# Patient Record
Sex: FEMALE | Race: OTHER
[De-identification: names, ages, dates, MRNs, and addresses within clinical notes are randomized per-mention and may not be internally consistent; named-entity substitution may affect disease eponyms.]

---

## 2017-01-06 ENCOUNTER — APPOINTMENT (OUTPATIENT)
Dept: ANTEPARTUM | Facility: CLINIC | Age: 32
End: 2017-01-06

## 2017-01-06 ENCOUNTER — ASOB RESULT (OUTPATIENT)
Age: 32
End: 2017-01-06

## 2017-01-09 ENCOUNTER — OUTPATIENT (OUTPATIENT)
Dept: INPATIENT UNIT | Facility: HOSPITAL | Age: 32
LOS: 1 days | Discharge: HOME | End: 2017-01-09

## 2017-01-13 ENCOUNTER — APPOINTMENT (OUTPATIENT)
Dept: ANTEPARTUM | Facility: CLINIC | Age: 32
End: 2017-01-13

## 2017-01-13 ENCOUNTER — ASOB RESULT (OUTPATIENT)
Age: 32
End: 2017-01-13

## 2017-01-16 ENCOUNTER — RESULT REVIEW (OUTPATIENT)
Age: 32
End: 2017-01-16

## 2017-01-17 ENCOUNTER — INPATIENT (INPATIENT)
Facility: HOSPITAL | Age: 32
LOS: 2 days | Discharge: HOME | End: 2017-01-20
Attending: OBSTETRICS & GYNECOLOGY | Admitting: OBSTETRICS & GYNECOLOGY
Payer: MEDICAID

## 2017-01-17 DIAGNOSIS — O47.9 FALSE LABOR, UNSPECIFIED: ICD-10-CM

## 2017-01-17 PROCEDURE — 88302 TISSUE EXAM BY PATHOLOGIST: CPT | Mod: 26

## 2017-01-20 ENCOUNTER — APPOINTMENT (OUTPATIENT)
Dept: ANTEPARTUM | Facility: CLINIC | Age: 32
End: 2017-01-20

## 2017-01-25 ENCOUNTER — APPOINTMENT (OUTPATIENT)
Dept: ANTEPARTUM | Facility: CLINIC | Age: 32
End: 2017-01-25

## 2017-01-26 DIAGNOSIS — Z34.83 ENCOUNTER FOR SUPERVISION OF OTHER NORMAL PREGNANCY, THIRD TRIMESTER: ICD-10-CM

## 2017-01-26 DIAGNOSIS — Z3A.38 38 WEEKS GESTATION OF PREGNANCY: ICD-10-CM

## 2017-01-26 DIAGNOSIS — E66.9 OBESITY, UNSPECIFIED: ICD-10-CM

## 2017-01-26 DIAGNOSIS — O34.212 MATERNAL CARE FOR VERTICAL SCAR FROM PREVIOUS CESAREAN DELIVERY: ICD-10-CM

## 2017-01-26 DIAGNOSIS — Z30.2 ENCOUNTER FOR STERILIZATION: ICD-10-CM

## 2018-03-23 ENCOUNTER — RESULT REVIEW (OUTPATIENT)
Age: 33
End: 2018-03-23

## 2018-07-19 ENCOUNTER — EMERGENCY (EMERGENCY)
Facility: HOSPITAL | Age: 33
LOS: 0 days | Discharge: HOME | End: 2018-07-19
Attending: EMERGENCY MEDICINE | Admitting: EMERGENCY MEDICINE
Payer: MEDICAID

## 2018-07-19 VITALS
TEMPERATURE: 99 F | DIASTOLIC BLOOD PRESSURE: 81 MMHG | OXYGEN SATURATION: 100 % | RESPIRATION RATE: 18 BRPM | SYSTOLIC BLOOD PRESSURE: 128 MMHG | HEIGHT: 64 IN | WEIGHT: 179.9 LBS | HEART RATE: 73 BPM

## 2018-07-19 VITALS
DIASTOLIC BLOOD PRESSURE: 72 MMHG | OXYGEN SATURATION: 98 % | RESPIRATION RATE: 16 BRPM | SYSTOLIC BLOOD PRESSURE: 111 MMHG | HEART RATE: 73 BPM | TEMPERATURE: 98 F

## 2018-07-19 DIAGNOSIS — K52.9 NONINFECTIVE GASTROENTERITIS AND COLITIS, UNSPECIFIED: ICD-10-CM

## 2018-07-19 LAB
ALBUMIN SERPL ELPH-MCNC: 3.4 G/DL — SIGNIFICANT CHANGE UP (ref 3.3–5)
ALP SERPL-CCNC: 85 U/L — SIGNIFICANT CHANGE UP (ref 40–120)
ALT FLD-CCNC: 29 U/L — SIGNIFICANT CHANGE UP (ref 12–78)
ANION GAP SERPL CALC-SCNC: 6 MMOL/L — SIGNIFICANT CHANGE UP (ref 5–17)
APPEARANCE UR: CLEAR — SIGNIFICANT CHANGE UP
APTT BLD: 32.7 SEC — SIGNIFICANT CHANGE UP (ref 27.5–37.4)
AST SERPL-CCNC: 17 U/L — SIGNIFICANT CHANGE UP (ref 15–37)
BACTERIA # UR AUTO: ABNORMAL
BASOPHILS # BLD AUTO: 0 K/UL — SIGNIFICANT CHANGE UP (ref 0–0.2)
BASOPHILS NFR BLD AUTO: 0 % — SIGNIFICANT CHANGE UP (ref 0–2)
BILIRUB SERPL-MCNC: 0.6 MG/DL — SIGNIFICANT CHANGE UP (ref 0.2–1.2)
BILIRUB UR-MCNC: NEGATIVE — SIGNIFICANT CHANGE UP
BLD GP AB SCN SERPL QL: SIGNIFICANT CHANGE UP
BUN SERPL-MCNC: 13 MG/DL — SIGNIFICANT CHANGE UP (ref 7–23)
CALCIUM SERPL-MCNC: 9 MG/DL — SIGNIFICANT CHANGE UP (ref 8.5–10.1)
CHLORIDE SERPL-SCNC: 107 MMOL/L — SIGNIFICANT CHANGE UP (ref 96–108)
CO2 SERPL-SCNC: 26 MMOL/L — SIGNIFICANT CHANGE UP (ref 22–31)
COLOR SPEC: ABNORMAL
CREAT SERPL-MCNC: 0.53 MG/DL — SIGNIFICANT CHANGE UP (ref 0.5–1.3)
DIFF PNL FLD: ABNORMAL
EOSINOPHIL # BLD AUTO: 0 K/UL — SIGNIFICANT CHANGE UP (ref 0–0.5)
EOSINOPHIL NFR BLD AUTO: 0 % — SIGNIFICANT CHANGE UP (ref 0–6)
EPI CELLS # UR: SIGNIFICANT CHANGE UP
GLUCOSE SERPL-MCNC: 111 MG/DL — HIGH (ref 70–99)
GLUCOSE UR QL: NEGATIVE MG/DL — SIGNIFICANT CHANGE UP
HCG SERPL-ACNC: <1 MIU/ML — SIGNIFICANT CHANGE UP
HCT VFR BLD CALC: 38.5 % — SIGNIFICANT CHANGE UP (ref 34.5–45)
HGB BLD-MCNC: 12.5 G/DL — SIGNIFICANT CHANGE UP (ref 11.5–15.5)
INR BLD: 1.05 RATIO — SIGNIFICANT CHANGE UP (ref 0.88–1.16)
KETONES UR-MCNC: ABNORMAL
LEUKOCYTE ESTERASE UR-ACNC: ABNORMAL
LIDOCAIN IGE QN: 117 U/L — SIGNIFICANT CHANGE UP (ref 73–393)
LYMPHOCYTES # BLD AUTO: 26 % — SIGNIFICANT CHANGE UP (ref 13–44)
LYMPHOCYTES # BLD AUTO: 4.51 K/UL — HIGH (ref 1–3.3)
MANUAL SMEAR VERIFICATION: SIGNIFICANT CHANGE UP
MCHC RBC-ENTMCNC: 26.5 PG — LOW (ref 27–34)
MCHC RBC-ENTMCNC: 32.5 GM/DL — SIGNIFICANT CHANGE UP (ref 32–36)
MCV RBC AUTO: 81.7 FL — SIGNIFICANT CHANGE UP (ref 80–100)
MONOCYTES # BLD AUTO: 1.21 K/UL — HIGH (ref 0–0.9)
MONOCYTES NFR BLD AUTO: 7 % — SIGNIFICANT CHANGE UP (ref 2–14)
NEUTROPHILS # BLD AUTO: 11.62 K/UL — HIGH (ref 1.8–7.4)
NEUTROPHILS NFR BLD AUTO: 67 % — SIGNIFICANT CHANGE UP (ref 43–77)
NITRITE UR-MCNC: NEGATIVE — SIGNIFICANT CHANGE UP
NRBC # BLD: 0 /100 — SIGNIFICANT CHANGE UP (ref 0–0)
NRBC # BLD: SIGNIFICANT CHANGE UP /100 WBCS (ref 0–0)
PH UR: 5 — SIGNIFICANT CHANGE UP (ref 5–8)
PLAT MORPH BLD: NORMAL — SIGNIFICANT CHANGE UP
PLATELET # BLD AUTO: 419 K/UL — HIGH (ref 150–400)
POTASSIUM SERPL-MCNC: 4.2 MMOL/L — SIGNIFICANT CHANGE UP (ref 3.5–5.3)
POTASSIUM SERPL-SCNC: 4.2 MMOL/L — SIGNIFICANT CHANGE UP (ref 3.5–5.3)
PROT SERPL-MCNC: 7.8 GM/DL — SIGNIFICANT CHANGE UP (ref 6–8.3)
PROT UR-MCNC: 100 MG/DL
PROTHROM AB SERPL-ACNC: 11.3 SEC — SIGNIFICANT CHANGE UP (ref 9.8–12.7)
RBC # BLD: 4.71 M/UL — SIGNIFICANT CHANGE UP (ref 3.8–5.2)
RBC # FLD: 13.3 % — SIGNIFICANT CHANGE UP (ref 10.3–14.5)
RBC BLD AUTO: NORMAL — SIGNIFICANT CHANGE UP
RBC CASTS # UR COMP ASSIST: ABNORMAL /HPF (ref 0–4)
SODIUM SERPL-SCNC: 139 MMOL/L — SIGNIFICANT CHANGE UP (ref 135–145)
SP GR SPEC: 1.02 — SIGNIFICANT CHANGE UP (ref 1.01–1.02)
TYPE + AB SCN PNL BLD: SIGNIFICANT CHANGE UP
UROBILINOGEN FLD QL: NEGATIVE MG/DL — SIGNIFICANT CHANGE UP
WBC # BLD: 17.35 K/UL — HIGH (ref 3.8–10.5)
WBC # FLD AUTO: 17.35 K/UL — HIGH (ref 3.8–10.5)
WBC UR QL: SIGNIFICANT CHANGE UP

## 2018-07-19 PROCEDURE — 74177 CT ABD & PELVIS W/CONTRAST: CPT | Mod: 26

## 2018-07-19 PROCEDURE — 99284 EMERGENCY DEPT VISIT MOD MDM: CPT | Mod: 25

## 2018-07-19 RX ORDER — ONDANSETRON 8 MG/1
1 TABLET, FILM COATED ORAL
Qty: 12 | Refills: 0
Start: 2018-07-19 | End: 2018-07-21

## 2018-07-19 RX ORDER — FAMOTIDINE 10 MG/ML
20 INJECTION INTRAVENOUS ONCE
Refills: 0 | Status: DISCONTINUED | OUTPATIENT
Start: 2018-07-19 | End: 2018-07-19

## 2018-07-19 RX ORDER — SODIUM CHLORIDE 9 MG/ML
1000 INJECTION INTRAMUSCULAR; INTRAVENOUS; SUBCUTANEOUS ONCE
Refills: 0 | Status: COMPLETED | OUTPATIENT
Start: 2018-07-19 | End: 2018-07-19

## 2018-07-19 RX ORDER — MORPHINE SULFATE 50 MG/1
2 CAPSULE, EXTENDED RELEASE ORAL ONCE
Refills: 0 | Status: DISCONTINUED | OUTPATIENT
Start: 2018-07-19 | End: 2018-07-19

## 2018-07-19 RX ORDER — KETOROLAC TROMETHAMINE 30 MG/ML
30 SYRINGE (ML) INJECTION ONCE
Refills: 0 | Status: COMPLETED | OUTPATIENT
Start: 2018-07-19 | End: 2018-07-19

## 2018-07-19 RX ORDER — ONDANSETRON 8 MG/1
4 TABLET, FILM COATED ORAL ONCE
Refills: 0 | Status: DISCONTINUED | OUTPATIENT
Start: 2018-07-19 | End: 2018-07-19

## 2018-07-19 RX ORDER — SODIUM CHLORIDE 9 MG/ML
3 INJECTION INTRAMUSCULAR; INTRAVENOUS; SUBCUTANEOUS ONCE
Refills: 0 | Status: COMPLETED | OUTPATIENT
Start: 2018-07-19 | End: 2018-07-19

## 2018-07-19 RX ADMIN — SODIUM CHLORIDE 1000 MILLILITER(S): 9 INJECTION INTRAMUSCULAR; INTRAVENOUS; SUBCUTANEOUS at 01:23

## 2018-07-19 RX ADMIN — SODIUM CHLORIDE 3 MILLILITER(S): 9 INJECTION INTRAMUSCULAR; INTRAVENOUS; SUBCUTANEOUS at 01:37

## 2018-07-19 RX ADMIN — MORPHINE SULFATE 2 MILLIGRAM(S): 50 CAPSULE, EXTENDED RELEASE ORAL at 03:10

## 2018-07-19 RX ADMIN — MORPHINE SULFATE 2 MILLIGRAM(S): 50 CAPSULE, EXTENDED RELEASE ORAL at 04:19

## 2018-07-19 NOTE — ED ADULT NURSE NOTE - OBJECTIVE STATEMENT
Patient comes to ED for RLQ abdominal pain for the last 4 hours. pt reports nausea, vomiting and diarrhea.

## 2019-05-03 ENCOUNTER — EMERGENCY (EMERGENCY)
Facility: HOSPITAL | Age: 34
LOS: 0 days | Discharge: HOME | End: 2019-05-03
Attending: EMERGENCY MEDICINE | Admitting: EMERGENCY MEDICINE
Payer: MEDICAID

## 2019-05-03 VITALS
RESPIRATION RATE: 18 BRPM | SYSTOLIC BLOOD PRESSURE: 121 MMHG | TEMPERATURE: 103 F | HEART RATE: 136 BPM | OXYGEN SATURATION: 98 % | DIASTOLIC BLOOD PRESSURE: 79 MMHG

## 2019-05-03 VITALS
SYSTOLIC BLOOD PRESSURE: 113 MMHG | RESPIRATION RATE: 16 BRPM | TEMPERATURE: 98 F | DIASTOLIC BLOOD PRESSURE: 70 MMHG | HEART RATE: 108 BPM | OXYGEN SATURATION: 100 %

## 2019-05-03 DIAGNOSIS — J02.9 ACUTE PHARYNGITIS, UNSPECIFIED: ICD-10-CM

## 2019-05-03 DIAGNOSIS — J03.90 ACUTE TONSILLITIS, UNSPECIFIED: ICD-10-CM

## 2019-05-03 LAB
ALBUMIN SERPL ELPH-MCNC: 3.9 G/DL — SIGNIFICANT CHANGE UP (ref 3.3–5)
ALP SERPL-CCNC: 84 U/L — SIGNIFICANT CHANGE UP (ref 40–120)
ALT FLD-CCNC: 43 U/L — SIGNIFICANT CHANGE UP (ref 12–78)
ANION GAP SERPL CALC-SCNC: 7 MMOL/L — SIGNIFICANT CHANGE UP (ref 5–17)
APPEARANCE UR: CLEAR — SIGNIFICANT CHANGE UP
APTT BLD: 34 SEC — SIGNIFICANT CHANGE UP (ref 27.5–36.3)
AST SERPL-CCNC: 14 U/L — LOW (ref 15–37)
BACTERIA # UR AUTO: ABNORMAL
BASOPHILS # BLD AUTO: 0.05 K/UL — SIGNIFICANT CHANGE UP (ref 0–0.2)
BASOPHILS NFR BLD AUTO: 0.3 % — SIGNIFICANT CHANGE UP (ref 0–2)
BILIRUB SERPL-MCNC: 0.8 MG/DL — SIGNIFICANT CHANGE UP (ref 0.2–1.2)
BILIRUB UR-MCNC: NEGATIVE — SIGNIFICANT CHANGE UP
BUN SERPL-MCNC: 14 MG/DL — SIGNIFICANT CHANGE UP (ref 7–23)
CALCIUM SERPL-MCNC: 9.4 MG/DL — SIGNIFICANT CHANGE UP (ref 8.5–10.1)
CHLORIDE SERPL-SCNC: 104 MMOL/L — SIGNIFICANT CHANGE UP (ref 96–108)
CO2 SERPL-SCNC: 24 MMOL/L — SIGNIFICANT CHANGE UP (ref 22–31)
COLOR SPEC: YELLOW — SIGNIFICANT CHANGE UP
CREAT SERPL-MCNC: 0.64 MG/DL — SIGNIFICANT CHANGE UP (ref 0.5–1.3)
DIFF PNL FLD: ABNORMAL
EOSINOPHIL # BLD AUTO: 0.03 K/UL — SIGNIFICANT CHANGE UP (ref 0–0.5)
EOSINOPHIL NFR BLD AUTO: 0.2 % — SIGNIFICANT CHANGE UP (ref 0–6)
EPI CELLS # UR: SIGNIFICANT CHANGE UP
GLUCOSE SERPL-MCNC: 102 MG/DL — HIGH (ref 70–99)
GLUCOSE UR QL: NEGATIVE MG/DL — SIGNIFICANT CHANGE UP
HCT VFR BLD CALC: 39.4 % — SIGNIFICANT CHANGE UP (ref 34.5–45)
HGB BLD-MCNC: 12.6 G/DL — SIGNIFICANT CHANGE UP (ref 11.5–15.5)
IMM GRANULOCYTES NFR BLD AUTO: 0.8 % — SIGNIFICANT CHANGE UP (ref 0–1.5)
INR BLD: 1.12 RATIO — SIGNIFICANT CHANGE UP (ref 0.88–1.16)
KETONES UR-MCNC: NEGATIVE — SIGNIFICANT CHANGE UP
LACTATE SERPL-SCNC: 1.1 MMOL/L — SIGNIFICANT CHANGE UP (ref 0.7–2)
LEUKOCYTE ESTERASE UR-ACNC: NEGATIVE — SIGNIFICANT CHANGE UP
LYMPHOCYTES # BLD AUTO: 1.41 K/UL — SIGNIFICANT CHANGE UP (ref 1–3.3)
LYMPHOCYTES # BLD AUTO: 8.2 % — LOW (ref 13–44)
MCHC RBC-ENTMCNC: 26.9 PG — LOW (ref 27–34)
MCHC RBC-ENTMCNC: 32 GM/DL — SIGNIFICANT CHANGE UP (ref 32–36)
MCV RBC AUTO: 84 FL — SIGNIFICANT CHANGE UP (ref 80–100)
MONOCYTES # BLD AUTO: 0.8 K/UL — SIGNIFICANT CHANGE UP (ref 0–0.9)
MONOCYTES NFR BLD AUTO: 4.6 % — SIGNIFICANT CHANGE UP (ref 2–14)
NEUTROPHILS # BLD AUTO: 14.86 K/UL — HIGH (ref 1.8–7.4)
NEUTROPHILS NFR BLD AUTO: 85.9 % — HIGH (ref 43–77)
NITRITE UR-MCNC: NEGATIVE — SIGNIFICANT CHANGE UP
NRBC # BLD: 0 /100 WBCS — SIGNIFICANT CHANGE UP (ref 0–0)
PH UR: 6.5 — SIGNIFICANT CHANGE UP (ref 5–8)
PLATELET # BLD AUTO: 323 K/UL — SIGNIFICANT CHANGE UP (ref 150–400)
POTASSIUM SERPL-MCNC: 3.6 MMOL/L — SIGNIFICANT CHANGE UP (ref 3.5–5.3)
POTASSIUM SERPL-SCNC: 3.6 MMOL/L — SIGNIFICANT CHANGE UP (ref 3.5–5.3)
PROT SERPL-MCNC: 8.1 GM/DL — SIGNIFICANT CHANGE UP (ref 6–8.3)
PROT UR-MCNC: 100 MG/DL
PROTHROM AB SERPL-ACNC: 12.5 SEC — SIGNIFICANT CHANGE UP (ref 10–12.9)
RBC # BLD: 4.69 M/UL — SIGNIFICANT CHANGE UP (ref 3.8–5.2)
RBC # FLD: 13.6 % — SIGNIFICANT CHANGE UP (ref 10.3–14.5)
RBC CASTS # UR COMP ASSIST: >50 /HPF (ref 0–4)
SODIUM SERPL-SCNC: 135 MMOL/L — SIGNIFICANT CHANGE UP (ref 135–145)
SP GR SPEC: 1.01 — SIGNIFICANT CHANGE UP (ref 1.01–1.02)
UROBILINOGEN FLD QL: NEGATIVE MG/DL — SIGNIFICANT CHANGE UP
WBC # BLD: 17.29 K/UL — HIGH (ref 3.8–10.5)
WBC # FLD AUTO: 17.29 K/UL — HIGH (ref 3.8–10.5)
WBC UR QL: SIGNIFICANT CHANGE UP

## 2019-05-03 PROCEDURE — 99284 EMERGENCY DEPT VISIT MOD MDM: CPT | Mod: 25

## 2019-05-03 PROCEDURE — 93010 ELECTROCARDIOGRAM REPORT: CPT

## 2019-05-03 RX ORDER — IBUPROFEN 200 MG
600 TABLET ORAL ONCE
Refills: 0 | Status: COMPLETED | OUTPATIENT
Start: 2019-05-03 | End: 2019-05-03

## 2019-05-03 RX ORDER — AMOXICILLIN 250 MG/5ML
1000 SUSPENSION, RECONSTITUTED, ORAL (ML) ORAL ONCE
Refills: 0 | Status: COMPLETED | OUTPATIENT
Start: 2019-05-03 | End: 2019-05-03

## 2019-05-03 RX ORDER — ACETAMINOPHEN 500 MG
325 TABLET ORAL ONCE
Refills: 0 | Status: COMPLETED | OUTPATIENT
Start: 2019-05-03 | End: 2019-05-03

## 2019-05-03 RX ORDER — SODIUM CHLORIDE 9 MG/ML
2000 INJECTION, SOLUTION INTRAVENOUS ONCE
Refills: 0 | Status: COMPLETED | OUTPATIENT
Start: 2019-05-03 | End: 2019-05-03

## 2019-05-03 RX ORDER — ACETAMINOPHEN 500 MG
650 TABLET ORAL ONCE
Refills: 0 | Status: COMPLETED | OUTPATIENT
Start: 2019-05-03 | End: 2019-05-03

## 2019-05-03 RX ORDER — AMOXICILLIN 250 MG/5ML
1 SUSPENSION, RECONSTITUTED, ORAL (ML) ORAL
Qty: 20 | Refills: 0
Start: 2019-05-03 | End: 2019-05-12

## 2019-05-03 RX ADMIN — SODIUM CHLORIDE 2000 MILLILITER(S): 9 INJECTION, SOLUTION INTRAVENOUS at 19:47

## 2019-05-03 RX ADMIN — SODIUM CHLORIDE 2000 MILLILITER(S): 9 INJECTION, SOLUTION INTRAVENOUS at 20:50

## 2019-05-03 RX ADMIN — Medication 325 MILLIGRAM(S): at 21:24

## 2019-05-03 RX ADMIN — Medication 1000 MILLIGRAM(S): at 19:47

## 2019-05-03 RX ADMIN — Medication 650 MILLIGRAM(S): at 20:54

## 2019-05-03 RX ADMIN — Medication 600 MILLIGRAM(S): at 19:47

## 2019-05-03 NOTE — ED ADULT NURSE NOTE - OBJECTIVE STATEMENT
pt presents to ED c/o chills, sore throat and b/l ear pain since this morning. pt did not take temp at home but reports subjective fevers at home. denies n/v, SOB, and chest pain. Pt AOx4, breathing symmetrical and unlabored, pt in no acute distress at this time.

## 2019-05-03 NOTE — ED STATDOCS - ENMT, MLM
Nasal mucosa clear.  Mouth with normal mucosa +tonsillar erythema, swelling and exudates, cervical lymphadenopathy. +fluid behind bilateral TM's

## 2019-05-03 NOTE — ED ADULT NURSE NOTE - NSIMPLEMENTINTERV_GEN_ALL_ED
Implemented All Universal Safety Interventions:  [place] to call system. Call bell, personal items and telephone within reach. Instruct patient to call for assistance. Room bathroom lighting operational. Non-slip footwear when patient is off stretcher. Physically safe environment: no spills, clutter or unnecessary equipment. Stretcher in lowest position, wheels locked, appropriate side rails in place.

## 2019-05-03 NOTE — ED STATDOCS - PROGRESS NOTE DETAILS
ALEXEY Anna:   Patient has been seen, evaluated and orders have been written by the attending in intake. Patient is stable.  I will follow up the results of orders written and I will continue to evaluate/observe the patient.   Samantha Anna PA-C On re-eval, pt was febrile to 102.4,  Tachy 129.  ADded PO Tylenol s/p 2 Liters of IVF.  WBC 17,000.     Pt able to swallow tablets.  Exudates to tonsils bilat.  Uvula midline and rising.  Tender cervical LAD.   HR improving.  Repeat Temp 98.1.  Will dc home.  cont. Amxoil and Motrin / Tylenol.  F/U with PMD.  Samantha Anna PA-C

## 2019-05-03 NOTE — ED STATDOCS - OBJECTIVE STATEMENT
33 y/o female with no relevant PMHx presents to the ED c/o sore throat and fever since this morning. +bilateral ear pain. +myalgias. +dysuria. Denies n/v/d, cough. Denies sick contacts. Pt did not receive flu vaccine this year. No recent travel. NKDA. 33 y/o female with no relevant PMHx presents to the ED c/o sore throat and fever since this morning. +bilateral ear pain. +myalgias. +dysuria. Denies n/v/d, cough. + chills and sweats. No meds for symptoms. No neck pain, AMS, rashes, abd pain, vag dc, travel.  Denies sick contacts. Pt did not receive flu vaccine this year. No recent travel. NKDA.

## 2019-05-03 NOTE — ED STATDOCS - CLINICAL SUMMARY MEDICAL DECISION MAKING FREE TEXT BOX
Pt with likely strep pharyngitis. Given abnormal vital signs, will do labs and cultures. Will give amoxicillin for sx control and ibuprofen for fever control.

## 2019-05-05 LAB
CULTURE RESULTS: SIGNIFICANT CHANGE UP
SPECIMEN SOURCE: SIGNIFICANT CHANGE UP

## 2019-05-09 LAB
CULTURE RESULTS: SIGNIFICANT CHANGE UP
CULTURE RESULTS: SIGNIFICANT CHANGE UP
SPECIMEN SOURCE: SIGNIFICANT CHANGE UP
SPECIMEN SOURCE: SIGNIFICANT CHANGE UP

## 2019-06-17 ENCOUNTER — EMERGENCY (EMERGENCY)
Facility: HOSPITAL | Age: 34
LOS: 0 days | Discharge: HOME | End: 2019-06-17
Attending: STUDENT IN AN ORGANIZED HEALTH CARE EDUCATION/TRAINING PROGRAM | Admitting: STUDENT IN AN ORGANIZED HEALTH CARE EDUCATION/TRAINING PROGRAM
Payer: MEDICAID

## 2019-06-17 VITALS
TEMPERATURE: 98 F | DIASTOLIC BLOOD PRESSURE: 76 MMHG | OXYGEN SATURATION: 96 % | SYSTOLIC BLOOD PRESSURE: 117 MMHG | RESPIRATION RATE: 18 BRPM | HEART RATE: 85 BPM

## 2019-06-17 VITALS — WEIGHT: 179.9 LBS | HEIGHT: 63 IN

## 2019-06-17 DIAGNOSIS — R10.30 LOWER ABDOMINAL PAIN, UNSPECIFIED: ICD-10-CM

## 2019-06-17 DIAGNOSIS — Z98.891 HISTORY OF UTERINE SCAR FROM PREVIOUS SURGERY: Chronic | ICD-10-CM

## 2019-06-17 DIAGNOSIS — R10.9 UNSPECIFIED ABDOMINAL PAIN: ICD-10-CM

## 2019-06-17 LAB
ALBUMIN SERPL ELPH-MCNC: 3.4 G/DL — SIGNIFICANT CHANGE UP (ref 3.3–5)
ALP SERPL-CCNC: 88 U/L — SIGNIFICANT CHANGE UP (ref 40–120)
ALT FLD-CCNC: 21 U/L — SIGNIFICANT CHANGE UP (ref 12–78)
ANION GAP SERPL CALC-SCNC: 7 MMOL/L — SIGNIFICANT CHANGE UP (ref 5–17)
APPEARANCE UR: CLEAR — SIGNIFICANT CHANGE UP
AST SERPL-CCNC: 13 U/L — LOW (ref 15–37)
BASOPHILS # BLD AUTO: 0.04 K/UL — SIGNIFICANT CHANGE UP (ref 0–0.2)
BASOPHILS NFR BLD AUTO: 0.4 % — SIGNIFICANT CHANGE UP (ref 0–2)
BILIRUB SERPL-MCNC: 0.7 MG/DL — SIGNIFICANT CHANGE UP (ref 0.2–1.2)
BILIRUB UR-MCNC: NEGATIVE — SIGNIFICANT CHANGE UP
BUN SERPL-MCNC: 11 MG/DL — SIGNIFICANT CHANGE UP (ref 7–23)
CALCIUM SERPL-MCNC: 8.9 MG/DL — SIGNIFICANT CHANGE UP (ref 8.5–10.1)
CHLORIDE SERPL-SCNC: 106 MMOL/L — SIGNIFICANT CHANGE UP (ref 96–108)
CO2 SERPL-SCNC: 27 MMOL/L — SIGNIFICANT CHANGE UP (ref 22–31)
COLOR SPEC: YELLOW — SIGNIFICANT CHANGE UP
CREAT SERPL-MCNC: 0.43 MG/DL — LOW (ref 0.5–1.3)
DIFF PNL FLD: ABNORMAL
EOSINOPHIL # BLD AUTO: 0.1 K/UL — SIGNIFICANT CHANGE UP (ref 0–0.5)
EOSINOPHIL NFR BLD AUTO: 1.1 % — SIGNIFICANT CHANGE UP (ref 0–6)
EPI CELLS # UR: SIGNIFICANT CHANGE UP
GLUCOSE SERPL-MCNC: 99 MG/DL — SIGNIFICANT CHANGE UP (ref 70–99)
GLUCOSE UR QL: NEGATIVE MG/DL — SIGNIFICANT CHANGE UP
HCT VFR BLD CALC: 34.9 % — SIGNIFICANT CHANGE UP (ref 34.5–45)
HGB BLD-MCNC: 11.2 G/DL — LOW (ref 11.5–15.5)
IMM GRANULOCYTES NFR BLD AUTO: 0.9 % — SIGNIFICANT CHANGE UP (ref 0–1.5)
KETONES UR-MCNC: NEGATIVE — SIGNIFICANT CHANGE UP
LEUKOCYTE ESTERASE UR-ACNC: NEGATIVE — SIGNIFICANT CHANGE UP
LIDOCAIN IGE QN: 104 U/L — SIGNIFICANT CHANGE UP (ref 73–393)
LYMPHOCYTES # BLD AUTO: 2.56 K/UL — SIGNIFICANT CHANGE UP (ref 1–3.3)
LYMPHOCYTES # BLD AUTO: 27.6 % — SIGNIFICANT CHANGE UP (ref 13–44)
MCHC RBC-ENTMCNC: 26.9 PG — LOW (ref 27–34)
MCHC RBC-ENTMCNC: 32.1 GM/DL — SIGNIFICANT CHANGE UP (ref 32–36)
MCV RBC AUTO: 83.7 FL — SIGNIFICANT CHANGE UP (ref 80–100)
MONOCYTES # BLD AUTO: 0.61 K/UL — SIGNIFICANT CHANGE UP (ref 0–0.9)
MONOCYTES NFR BLD AUTO: 6.6 % — SIGNIFICANT CHANGE UP (ref 2–14)
NEUTROPHILS # BLD AUTO: 5.87 K/UL — SIGNIFICANT CHANGE UP (ref 1.8–7.4)
NEUTROPHILS NFR BLD AUTO: 63.4 % — SIGNIFICANT CHANGE UP (ref 43–77)
NITRITE UR-MCNC: NEGATIVE — SIGNIFICANT CHANGE UP
PH UR: 7 — SIGNIFICANT CHANGE UP (ref 5–8)
PLATELET # BLD AUTO: 355 K/UL — SIGNIFICANT CHANGE UP (ref 150–400)
POTASSIUM SERPL-MCNC: 3.6 MMOL/L — SIGNIFICANT CHANGE UP (ref 3.5–5.3)
POTASSIUM SERPL-SCNC: 3.6 MMOL/L — SIGNIFICANT CHANGE UP (ref 3.5–5.3)
PROT SERPL-MCNC: 7.3 GM/DL — SIGNIFICANT CHANGE UP (ref 6–8.3)
PROT UR-MCNC: 15 MG/DL
RBC # BLD: 4.17 M/UL — SIGNIFICANT CHANGE UP (ref 3.8–5.2)
RBC # FLD: 13.7 % — SIGNIFICANT CHANGE UP (ref 10.3–14.5)
RBC CASTS # UR COMP ASSIST: ABNORMAL /HPF (ref 0–4)
SODIUM SERPL-SCNC: 140 MMOL/L — SIGNIFICANT CHANGE UP (ref 135–145)
SP GR SPEC: 1.01 — SIGNIFICANT CHANGE UP (ref 1.01–1.02)
UROBILINOGEN FLD QL: NEGATIVE MG/DL — SIGNIFICANT CHANGE UP
WBC # BLD: 9.26 K/UL — SIGNIFICANT CHANGE UP (ref 3.8–10.5)
WBC # FLD AUTO: 9.26 K/UL — SIGNIFICANT CHANGE UP (ref 3.8–10.5)
WBC UR QL: SIGNIFICANT CHANGE UP

## 2019-06-17 PROCEDURE — 99285 EMERGENCY DEPT VISIT HI MDM: CPT

## 2019-06-17 PROCEDURE — 74177 CT ABD & PELVIS W/CONTRAST: CPT | Mod: 26

## 2019-06-17 RX ORDER — ONDANSETRON 8 MG/1
4 TABLET, FILM COATED ORAL ONCE
Refills: 0 | Status: COMPLETED | OUTPATIENT
Start: 2019-06-17 | End: 2019-06-17

## 2019-06-17 RX ORDER — KETOROLAC TROMETHAMINE 30 MG/ML
30 SYRINGE (ML) INJECTION ONCE
Refills: 0 | Status: DISCONTINUED | OUTPATIENT
Start: 2019-06-17 | End: 2019-06-17

## 2019-06-17 RX ORDER — SODIUM CHLORIDE 9 MG/ML
1000 INJECTION INTRAMUSCULAR; INTRAVENOUS; SUBCUTANEOUS ONCE
Refills: 0 | Status: COMPLETED | OUTPATIENT
Start: 2019-06-17 | End: 2019-06-17

## 2019-06-17 RX ORDER — MORPHINE SULFATE 50 MG/1
4 CAPSULE, EXTENDED RELEASE ORAL ONCE
Refills: 0 | Status: DISCONTINUED | OUTPATIENT
Start: 2019-06-17 | End: 2019-06-17

## 2019-06-17 RX ADMIN — ONDANSETRON 4 MILLIGRAM(S): 8 TABLET, FILM COATED ORAL at 09:40

## 2019-06-17 RX ADMIN — SODIUM CHLORIDE 1000 MILLILITER(S): 9 INJECTION INTRAMUSCULAR; INTRAVENOUS; SUBCUTANEOUS at 09:40

## 2019-06-17 RX ADMIN — Medication 30 MILLIGRAM(S): at 09:40

## 2019-06-17 RX ADMIN — MORPHINE SULFATE 4 MILLIGRAM(S): 50 CAPSULE, EXTENDED RELEASE ORAL at 10:22

## 2019-06-17 RX ADMIN — Medication 30 MILLIGRAM(S): at 09:55

## 2019-06-17 RX ADMIN — MORPHINE SULFATE 4 MILLIGRAM(S): 50 CAPSULE, EXTENDED RELEASE ORAL at 10:34

## 2019-06-17 RX ADMIN — SODIUM CHLORIDE 1000 MILLILITER(S): 9 INJECTION INTRAMUSCULAR; INTRAVENOUS; SUBCUTANEOUS at 10:41

## 2019-06-17 NOTE — ED ADULT NURSE NOTE - NSIMPLEMENTINTERV_GEN_ALL_ED
Implemented All Universal Safety Interventions:  [place] to call system. Call bell, personal items and telephone within reach. Instruct patient to call for assistance. Room bathroom lighting operational. Non-slip footwear when patient is off stretcher. Physically safe environment: no spills, clutter or unnecessary equipment. Stretcher in lowest position, wheels locked, appropriate side rails in place.

## 2019-06-17 NOTE — ED PROVIDER NOTE - OBJECTIVE STATEMENT
35 y/o female with a PMHx  presents to the ED c/o abd pain x3 days. Pt states 2 days ago she developed abd pain and back pain. Has been vomiting since onset of symptoms, total of 3 episodes. Denies diarrhea, constipation, fevers, chills, dysuria. 33 y/o female with a PMHx  presents to the ED c/o abd pain x3 days. Pt states 2 days ago she developed abd pain and back pain. Has been vomiting since onset of symptoms, total of 3 episodes. Denies diarrhea, constipation, fevers, chills, dysuria. No sick contacts.

## 2019-06-17 NOTE — ED ADULT NURSE NOTE - OBJECTIVE STATEMENT
Pt complains of pelvic and mid back pain bilaterally starting on Saturday, denies nausea, vomiting, urinary problems, fever.  Urine sample collected.  20g PIV placed in LAC.

## 2019-06-17 NOTE — ED PROVIDER NOTE - PROGRESS NOTE DETAILS
Lobito NP for Dr. Myers: Pt with no improvement in pain after being given Toradol. Will re-medicate and order CT abd, pelvis for further evaluation at this time. Lobito NP for Dr. Myers: Pt was found to have microscopic hematuria. Not on her period at this time. Pt notified of UA results. Lucia DO: Patient aware of microscopic hematuria; aware of findings on CT scan and need for close outpatient f/u; patient feels better at this time; no vomiting in ED; instructed to f/u with pmd in 1-2 days without fail; strict return precautions given.

## 2019-06-18 LAB
CULTURE RESULTS: SIGNIFICANT CHANGE UP
SPECIMEN SOURCE: SIGNIFICANT CHANGE UP

## 2019-09-15 ENCOUNTER — EMERGENCY (EMERGENCY)
Facility: HOSPITAL | Age: 34
LOS: 0 days | Discharge: HOME | End: 2019-09-15
Attending: EMERGENCY MEDICINE
Payer: MEDICAID

## 2019-09-15 VITALS
OXYGEN SATURATION: 99 % | HEART RATE: 95 BPM | WEIGHT: 190.04 LBS | DIASTOLIC BLOOD PRESSURE: 87 MMHG | SYSTOLIC BLOOD PRESSURE: 123 MMHG | HEIGHT: 62 IN | TEMPERATURE: 98 F | RESPIRATION RATE: 18 BRPM

## 2019-09-15 VITALS
DIASTOLIC BLOOD PRESSURE: 64 MMHG | SYSTOLIC BLOOD PRESSURE: 114 MMHG | TEMPERATURE: 98 F | OXYGEN SATURATION: 99 % | RESPIRATION RATE: 18 BRPM | HEART RATE: 75 BPM

## 2019-09-15 DIAGNOSIS — R51 HEADACHE: ICD-10-CM

## 2019-09-15 DIAGNOSIS — Z98.891 HISTORY OF UTERINE SCAR FROM PREVIOUS SURGERY: Chronic | ICD-10-CM

## 2019-09-15 PROCEDURE — 99284 EMERGENCY DEPT VISIT MOD MDM: CPT | Mod: 25

## 2019-09-15 PROCEDURE — 70450 CT HEAD/BRAIN W/O DYE: CPT

## 2019-09-15 PROCEDURE — 99284 EMERGENCY DEPT VISIT MOD MDM: CPT

## 2019-09-15 PROCEDURE — 70450 CT HEAD/BRAIN W/O DYE: CPT | Mod: 26

## 2019-09-15 PROCEDURE — 96375 TX/PRO/DX INJ NEW DRUG ADDON: CPT

## 2019-09-15 PROCEDURE — 96374 THER/PROPH/DIAG INJ IV PUSH: CPT

## 2019-09-15 RX ORDER — SODIUM CHLORIDE 9 MG/ML
1000 INJECTION INTRAMUSCULAR; INTRAVENOUS; SUBCUTANEOUS ONCE
Refills: 0 | Status: COMPLETED | OUTPATIENT
Start: 2019-09-15 | End: 2019-09-15

## 2019-09-15 RX ORDER — DIPHENHYDRAMINE HCL 50 MG
25 CAPSULE ORAL ONCE
Refills: 0 | Status: DISCONTINUED | OUTPATIENT
Start: 2019-09-15 | End: 2019-09-15

## 2019-09-15 RX ORDER — METOCLOPRAMIDE HCL 10 MG
10 TABLET ORAL ONCE
Refills: 0 | Status: COMPLETED | OUTPATIENT
Start: 2019-09-15 | End: 2019-09-15

## 2019-09-15 RX ORDER — DIPHENHYDRAMINE HCL 50 MG
25 CAPSULE ORAL ONCE
Refills: 0 | Status: COMPLETED | OUTPATIENT
Start: 2019-09-15 | End: 2019-09-15

## 2019-09-15 RX ORDER — KETOROLAC TROMETHAMINE 30 MG/ML
30 SYRINGE (ML) INJECTION ONCE
Refills: 0 | Status: DISCONTINUED | OUTPATIENT
Start: 2019-09-15 | End: 2019-09-15

## 2019-09-15 RX ADMIN — Medication 30 MILLIGRAM(S): at 03:41

## 2019-09-15 RX ADMIN — Medication 25 MILLIGRAM(S): at 03:42

## 2019-09-15 RX ADMIN — SODIUM CHLORIDE 1000 MILLILITER(S): 9 INJECTION INTRAMUSCULAR; INTRAVENOUS; SUBCUTANEOUS at 03:31

## 2019-09-15 RX ADMIN — Medication 10 MILLIGRAM(S): at 03:46

## 2019-09-15 NOTE — ED ADULT TRIAGE NOTE - CHIEF COMPLAINT QUOTE
Headache started yesterday worsening tonight. denies double vision or blurry vision. (+) nausea and vomiting x 3.

## 2019-09-15 NOTE — ED PROVIDER NOTE - NSFOLLOWUPINSTRUCTIONS_ED_ALL_ED_FT
Follow up with your doctor Monday  Ibuprofen 600mg every 6 hours if needed  Stay well hydrated  Return to ED  for any further concerns or worsening symptoms      Headache    A headache is pain or discomfort felt around the head or neck area. The specific cause of a headache may not be found as there are many types including tension headaches, migraine headaches, and cluster headaches. Watch your condition for any changes. Things you can do to manage your pain include taking over the counter and prescription medications as instructed by your health care provider, lying down in a dark quiet room, limiting stress, getting regular sleep, and refraining from alcohol and tobacco products.    SEEK IMMEDIATE MEDICAL CARE IF YOU HAVE ANY OF THE FOLLOWING SYMPTOMS: fever, vomiting, stiff neck, loss of vision, problems with speech, muscle weakness, loss of balance, trouble walking, passing out, or confusion.

## 2019-09-15 NOTE — ED PROVIDER NOTE - ENMT, MLM
Airway patent, Nasal mucosa clear. Mouth with normal mucosa. Throat has no vesicles, no oropharyngeal exudates and uvula is midline.  TMs clear b/l

## 2019-09-15 NOTE — ED PROVIDER NOTE - PATIENT PORTAL LINK FT
You can access the FollowMyHealth Patient Portal offered by [facility] by registering at the following website: [email or web address]. By joining [employer]’s FollowMyHealth portal, you will also be able to view your health information using other applications (apps) compatible with our system.

## 2019-09-15 NOTE — ED PROVIDER NOTE - OBJECTIVE STATEMENT
33 yo female with no pmh c/o headache.  Headache started on Friday and continued through Saturday.  Took 2 Excedrin at 9pm and a [place] B vitamin without relief of pain.  +nausea and vomited x 3 tonight.  No URI symptoms. No fever.  Gets similar headaches, but this is the worst one she's had.  Pain is all over the head front and back.  Nonsmoker, nondrinker.  LMP 8/14

## 2020-03-19 ENCOUNTER — EMERGENCY (EMERGENCY)
Facility: HOSPITAL | Age: 35
LOS: 0 days | Discharge: HOME | End: 2020-03-19
Payer: MEDICAID

## 2020-03-19 VITALS
TEMPERATURE: 100 F | DIASTOLIC BLOOD PRESSURE: 95 MMHG | WEIGHT: 184.09 LBS | OXYGEN SATURATION: 97 % | RESPIRATION RATE: 22 BRPM | SYSTOLIC BLOOD PRESSURE: 142 MMHG | HEART RATE: 107 BPM

## 2020-03-19 DIAGNOSIS — R50.9 FEVER, UNSPECIFIED: ICD-10-CM

## 2020-03-19 DIAGNOSIS — M79.10 MYALGIA, UNSPECIFIED SITE: ICD-10-CM

## 2020-03-19 DIAGNOSIS — R06.02 SHORTNESS OF BREATH: ICD-10-CM

## 2020-03-19 DIAGNOSIS — R05 COUGH: ICD-10-CM

## 2020-03-19 DIAGNOSIS — B97.29 OTHER CORONAVIRUS AS THE CAUSE OF DISEASES CLASSIFIED ELSEWHERE: ICD-10-CM

## 2020-03-19 DIAGNOSIS — Z98.891 HISTORY OF UTERINE SCAR FROM PREVIOUS SURGERY: Chronic | ICD-10-CM

## 2020-03-19 DIAGNOSIS — R51 HEADACHE: ICD-10-CM

## 2020-03-19 PROCEDURE — 99283 EMERGENCY DEPT VISIT LOW MDM: CPT

## 2020-03-19 PROCEDURE — U0001: CPT

## 2020-03-19 NOTE — ED STATDOCS - OBJECTIVE STATEMENT
34 yo female was BIB family with fever (100-101), cough, headache, body aches, x 3 days. Pt recently traveled from [place] for a  with unknown exposure. Pt here for testing.

## 2020-03-19 NOTE — ED ADULT NURSE NOTE - OBJECTIVE STATEMENT
Patient evaluated, treated, and discharged by PA. Refer to PA note for assessment.  Discharge instructions given verbally and understood by patient. Self-quarantine and COVID-19 information provided to patient. Unable to sign secondary to COVID-19 PUI.

## 2020-03-19 NOTE — ED STATDOCS - PATIENT PORTAL LINK FT
You can access the FollowMyHealth Patient Portal offered by [facility] by registering at the following website: [email or web address]. By joining [employer]’s FollowMyHealth portal, you will also be able to view your health information using other applications (apps) compatible with our system.

## 2020-03-21 ENCOUNTER — EMERGENCY (EMERGENCY)
Facility: HOSPITAL | Age: 35
LOS: 1 days | Discharge: LEFT BEFORE DISCHARGE | End: 2020-03-21

## 2020-03-21 VITALS
TEMPERATURE: 102 F | SYSTOLIC BLOOD PRESSURE: 126 MMHG | HEART RATE: 107 BPM | RESPIRATION RATE: 18 BRPM | DIASTOLIC BLOOD PRESSURE: 77 MMHG | OXYGEN SATURATION: 98 %

## 2020-03-21 DIAGNOSIS — Z53.21 PROCEDURE AND TREATMENT NOT CARRIED OUT DUE TO PATIENT LEAVING PRIOR TO BEING SEEN BY HEALTH CARE PROVIDER: ICD-10-CM

## 2020-03-21 DIAGNOSIS — Z98.891 HISTORY OF UTERINE SCAR FROM PREVIOUS SURGERY: Chronic | ICD-10-CM

## 2020-03-21 DIAGNOSIS — R05 COUGH: ICD-10-CM

## 2020-03-21 DIAGNOSIS — R50.9 FEVER, UNSPECIFIED: ICD-10-CM

## 2020-03-21 LAB — SARS-COV-2 RNA SPEC QL NAA+PROBE: (no result)

## 2020-08-21 NOTE — ED STATDOCS - DR. NAME
----- Message from Mechelle Mercer MD sent at 8/20/2020  7:59 PM CDT -----  Let p-t know - potassium level is normal  No change in meds, keep appt as scheduled in 09/2020  
Pt notified of results and she verbalize understanding  
Mastanduono

## 2021-06-04 ENCOUNTER — APPOINTMENT (OUTPATIENT)
Age: 36
End: 2021-06-04

## 2021-07-13 ENCOUNTER — TRANSCRIPTION ENCOUNTER (OUTPATIENT)
Age: 36
End: 2021-07-13

## 2021-09-28 ENCOUNTER — EMERGENCY (EMERGENCY)
Facility: HOSPITAL | Age: 36
LOS: 1 days | Discharge: HOME | End: 2021-09-28
Attending: EMERGENCY MEDICINE
Payer: MEDICAID

## 2021-09-28 VITALS
SYSTOLIC BLOOD PRESSURE: 117 MMHG | RESPIRATION RATE: 17 BRPM | TEMPERATURE: 98 F | DIASTOLIC BLOOD PRESSURE: 80 MMHG | OXYGEN SATURATION: 100 % | HEART RATE: 83 BPM

## 2021-09-28 DIAGNOSIS — R10.2 PELVIC AND PERINEAL PAIN: ICD-10-CM

## 2021-09-28 DIAGNOSIS — Z98.891 HISTORY OF UTERINE SCAR FROM PREVIOUS SURGERY: Chronic | ICD-10-CM

## 2021-09-28 DIAGNOSIS — K57.30 DIVERTICULOSIS OF LARGE INTESTINE WITHOUT PERFORATION OR ABSCESS WITHOUT BLEEDING: ICD-10-CM

## 2021-09-28 DIAGNOSIS — N39.0 URINARY TRACT INFECTION, SITE NOT SPECIFIED: ICD-10-CM

## 2021-09-28 DIAGNOSIS — R10.30 LOWER ABDOMINAL PAIN, UNSPECIFIED: ICD-10-CM

## 2021-09-28 DIAGNOSIS — R51.9 HEADACHE, UNSPECIFIED: ICD-10-CM

## 2021-09-28 DIAGNOSIS — R30.0 DYSURIA: ICD-10-CM

## 2021-09-28 DIAGNOSIS — N83.291 OTHER OVARIAN CYST, RIGHT SIDE: ICD-10-CM

## 2021-09-28 LAB
ANION GAP SERPL CALC-SCNC: 4 MMOL/L — LOW (ref 5–17)
APPEARANCE UR: CLEAR — SIGNIFICANT CHANGE UP
BILIRUB UR-MCNC: NEGATIVE — SIGNIFICANT CHANGE UP
BUN SERPL-MCNC: 16 MG/DL — SIGNIFICANT CHANGE UP (ref 7–23)
CALCIUM SERPL-MCNC: 9.1 MG/DL — SIGNIFICANT CHANGE UP (ref 8.5–10.1)
CHLORIDE SERPL-SCNC: 105 MMOL/L — SIGNIFICANT CHANGE UP (ref 96–108)
CO2 SERPL-SCNC: 29 MMOL/L — SIGNIFICANT CHANGE UP (ref 22–31)
COLOR SPEC: YELLOW — SIGNIFICANT CHANGE UP
CREAT SERPL-MCNC: 0.54 MG/DL — SIGNIFICANT CHANGE UP (ref 0.5–1.3)
DIFF PNL FLD: ABNORMAL
GLUCOSE SERPL-MCNC: 97 MG/DL — SIGNIFICANT CHANGE UP (ref 70–99)
GLUCOSE UR QL: NEGATIVE MG/DL — SIGNIFICANT CHANGE UP
HCG SERPL-ACNC: <1 MIU/ML — SIGNIFICANT CHANGE UP
HCT VFR BLD CALC: 36.7 % — SIGNIFICANT CHANGE UP (ref 34.5–45)
HGB BLD-MCNC: 11.6 G/DL — SIGNIFICANT CHANGE UP (ref 11.5–15.5)
KETONES UR-MCNC: NEGATIVE — SIGNIFICANT CHANGE UP
LEUKOCYTE ESTERASE UR-ACNC: NEGATIVE — SIGNIFICANT CHANGE UP
MCHC RBC-ENTMCNC: 26.6 PG — LOW (ref 27–34)
MCHC RBC-ENTMCNC: 31.6 GM/DL — LOW (ref 32–36)
MCV RBC AUTO: 84.2 FL — SIGNIFICANT CHANGE UP (ref 80–100)
NITRITE UR-MCNC: NEGATIVE — SIGNIFICANT CHANGE UP
PH UR: 7 — SIGNIFICANT CHANGE UP (ref 5–8)
PLATELET # BLD AUTO: 370 K/UL — SIGNIFICANT CHANGE UP (ref 150–400)
POTASSIUM SERPL-MCNC: 4.1 MMOL/L — SIGNIFICANT CHANGE UP (ref 3.5–5.3)
POTASSIUM SERPL-SCNC: 4.1 MMOL/L — SIGNIFICANT CHANGE UP (ref 3.5–5.3)
PROT UR-MCNC: 30 MG/DL
RBC # BLD: 4.36 M/UL — SIGNIFICANT CHANGE UP (ref 3.8–5.2)
RBC # FLD: 13.3 % — SIGNIFICANT CHANGE UP (ref 10.3–14.5)
SODIUM SERPL-SCNC: 138 MMOL/L — SIGNIFICANT CHANGE UP (ref 135–145)
SP GR SPEC: 1.01 — SIGNIFICANT CHANGE UP (ref 1.01–1.02)
UROBILINOGEN FLD QL: NEGATIVE MG/DL — SIGNIFICANT CHANGE UP
WBC # BLD: 12.37 K/UL — HIGH (ref 3.8–10.5)
WBC # FLD AUTO: 12.37 K/UL — HIGH (ref 3.8–10.5)

## 2021-09-28 PROCEDURE — 76856 US EXAM PELVIC COMPLETE: CPT

## 2021-09-28 PROCEDURE — 76830 TRANSVAGINAL US NON-OB: CPT

## 2021-09-28 PROCEDURE — 80048 BASIC METABOLIC PNL TOTAL CA: CPT

## 2021-09-28 PROCEDURE — 85025 COMPLETE CBC W/AUTO DIFF WBC: CPT

## 2021-09-28 PROCEDURE — 81001 URINALYSIS AUTO W/SCOPE: CPT

## 2021-09-28 PROCEDURE — 87491 CHLMYD TRACH DNA AMP PROBE: CPT

## 2021-09-28 PROCEDURE — 87591 N.GONORRHOEAE DNA AMP PROB: CPT

## 2021-09-28 PROCEDURE — 99285 EMERGENCY DEPT VISIT HI MDM: CPT

## 2021-09-28 PROCEDURE — 84702 CHORIONIC GONADOTROPIN TEST: CPT

## 2021-09-28 PROCEDURE — 99284 EMERGENCY DEPT VISIT MOD MDM: CPT | Mod: 25

## 2021-09-28 PROCEDURE — 87635 SARS-COV-2 COVID-19 AMP PRB: CPT

## 2021-09-28 PROCEDURE — 36415 COLL VENOUS BLD VENIPUNCTURE: CPT

## 2021-09-28 PROCEDURE — 74176 CT ABD & PELVIS W/O CONTRAST: CPT | Mod: 26,MA

## 2021-09-28 PROCEDURE — 74176 CT ABD & PELVIS W/O CONTRAST: CPT

## 2021-09-28 RX ORDER — IBUPROFEN 200 MG
600 TABLET ORAL ONCE
Refills: 0 | Status: COMPLETED | OUTPATIENT
Start: 2021-09-28 | End: 2021-09-28

## 2021-09-28 RX ADMIN — Medication 600 MILLIGRAM(S): at 21:48

## 2021-09-28 NOTE — ED STATDOCS - CLINICAL SUMMARY MEDICAL DECISION MAKING FREE TEXT BOX
will check UA, pt knows she needs to f/u with Dr. Patricio to have mammogram to make sure there is no cancer will check UA, UCG, give ibuprofen. pt knows she needs to f/u with Dr. Patricio to have mammogram to make sure there is no cancer

## 2021-09-28 NOTE — ED STATDOCS - PATIENT PORTAL LINK FT
You can access the FollowMyHealth Patient Portal offered by [facility] by registering at the following website: [email or web address]. By joining [employer]’s FollowMyHealth portal, you will also be able to view your health information using other applications (apps) compatible with our system.

## 2021-09-28 NOTE — ED ADULT TRIAGE NOTE - AS HEIGHT TYPE
Hospital # 6  ICU # 6    CC:  [facility]    HPI:    62 y/o female with depression/anxieity, Gastric metaplasia resulting in persistent nausea,  brain aneurysm, hyponatremia (admission in June for Na 118/AMS) who presents to ED with 2 weeks of Nausea unable to eat but continued to drink 8 8oz water daily and AMS found to have Na 111.  Pt was admitted to ICU and placed on .      9/7:  Case d/w SUNNY Woody.  Pt seen and examined in ICU--Na 115--awake and alert and able to provide Hx.  Wants to have soup.  borderline BP  9/8:  Pt seen and examined in ICU--awake and alert--still feels nausea (chronic).  Na 117. Had intermittent dysarthria yesterday similar to prior hyponatremic state--resolved    9/9: Still feels nausea--unable to really eat--NS stopped yesterday and Na has slowly decreased to 114 this morning--will start 3% at 20  9/10:  Na rising on 3%.  Poor appetite. Discussed ?? PEG for nutrition support but she refuses.  + dysarthric this morning   9/11:  Na 125.  3% off.  Sitting in chair.  +dysarthric this morning.  Eating more.  Will obtain Brain MR today  9/12:  Na 129.  3% stopped this morning.  Dysarthria better but still present.  Better PO intake.  Brain MR--no acute findings     PMH:  As above.     PSH:  As above.     FH: Non Contributory other than those listed in HPI    Social History:  NC    MEDICATIONS  (STANDING):  chlorhexidine 2% Cloths 1 Application(s) Topical <User Schedule>  heparin   Injectable 5000 Unit(s) SubCutaneous every 8 hours  influenza   Vaccine 0.5 milliLiter(s) IntraMuscular once  senna 2 Tablet(s) Oral at bedtime  sodium chloride 1 Gram(s) Oral two times a day    MEDICATIONS  (PRN):  acetaminophen   Tablet .. 650 milliGRAM(s) Oral every 6 hours PRN Temp greater or equal to 38.5C (101.3F), Mild Pain (1 - 3)  ibuprofen  Tablet. 400 milliGRAM(s) Oral every 6 hours PRN Moderate Pain (4 - 6)  melatonin 5 milliGRAM(s) Oral at bedtime PRN Insomnia  ondansetron Injectable 4 milliGRAM(s) IV Push every 6 hours PRN Nausea and/or Vomiting      Allergies: NKDA    ROS:  SEE BELOW        ICU Vital Signs Last 24 Hrs  T(C): 36.8 (12 Sep 2020 10:00), Max: 36.9 (11 Sep 2020 16:51)  T(F): 98.3 (12 Sep 2020 10:00), Max: 98.5 (11 Sep 2020 16:51)  HR: 80 (12 Sep 2020 11:00) (72 - 90)  BP: 147/70 (12 Sep 2020 09:00) (143/80 - 161/92)  BP(mean): 87 (12 Sep 2020 09:00) (87 - 117)  ABP: --  ABP(mean): --  RR: 24 (12 Sep 2020 11:00) (16 - 24)  SpO2: 100% (12 Sep 2020 11:00) (95% - 100%)          I&O's Summary    11 Sep 2020 07:01  -  12 Sep 2020 07:00  --------------------------------------------------------  IN: 0 mL / OUT: 325 mL / NET: -325 mL    12 Sep 2020 07:01  -  12 Sep 2020 12:52  --------------------------------------------------------  IN: 0 mL / OUT: 400 mL / NET: -400 mL        Physical Exam:  SEE BELOW                          11.8   10.75 )-----------( 180      ( 12 Sep 2020 06:22 )             34.0       09-12    129<L>  |  96  |  13  ----------------------------<  102<H>  4.3   |  27  |  0.67    Ca    9.0      12 Sep 2020 11:05  Phos  2.7     09-12  Mg     2.0     09-12                      DVT Prophylaxis:                                                            Contraindication:     Advanced Directives:    Discussed with:    Visit Information:  Time spent excluding procedure:      ** Time is exclusive of billed procedures and/or teaching and/or routine family updates.         stated

## 2021-09-28 NOTE — ED STATDOCS - OBJECTIVE STATEMENT
37 y/o female with no pertinent PMHx presents to ED c/o lower abdominal pain radiating to lower back and dysuria. States pain has been persistent since Sunday evening. Denies vaginal discharge. Took tylenol PTA. PMD: Dr. Patricio. Pt also c/o pain to left breast for 3 weeks.

## 2021-09-28 NOTE — ED STATDOCS - NSFOLLOWUPINSTRUCTIONS_ED_ALL_ED_FT
Infección del tracto urinario en mujeres    LO QUE NECESITA SABER:    ¿Qué es elizabeth infección del tracto urinario (ITU)?La ITU ocurre cuando entran bacterias en el tracto urinario. Robles tracto urinario incluye jessica riñones, uréteres, vejiga y uretra. La orina es producida en los riñones y fluye del uréter a la vejiga. La orina sale de la vejiga a través de la uretra. Elizabeth infección del tracto urinario es más común in la parte inferior de robles tracto urinario que incluye robles vejiga y uretra.     Aparato urinario femenino         ¿Qué aumenta mi riesgo de contraer elizabeth ITU?  •Elizabeth sonda urinaria o autosondaje      •Embarazo      •Problemas del tracto urinario, rupal un estrechamiento, cálculos renales o incapacidad de vaciar la vejiga por completo      •Historial de ITU      •Relaciones sexuales      •La menopausia      •La diabetes u obesidad      ¿Cuáles son los signos y síntomas de elizabeth ITU?  •Orinar con más frecuencia que de costumbre, perder orina o despertarse para orinar      •Dolor o ardor al orinar      •Dolor o presión en la parte inferior del abdomen      •Orina con mal olor      •Jono en la orina      ¿Cómo se diagnostica elizabeth infección en el tracto urinario?Robles médico le preguntará acerca de jessica signos y síntomas. Es posible que le presione el abdomen, los lados y la espalda para revisar si usted siente dolor. La orina se analizará para detectar bacterias que pueden estar causando la infección. Si tiene infecciones urinarias con frecuencia, puede necesitar más pruebas para encontrar la causa.    ¿Cómo se trata elizabeth infección en el tracto urinario?  •Los antibióticosayudan a combatir elizabeth infección bacteriana. Si tiene infecciones urinarias con frecuencia (llamadas recurrentes), se le pueden chris antibióticos para que los tome regularmente. Le enseñarán cuándo y cómo usar los antibióticos. El objetivo es prevenir las infecciones urinarias, jenniffer no causar resistencia a los antibióticos mediante el uso de antibióticos con demasiada frecuencia.      •Los medicamentospara disminuir el dolor y el ardor al orinar. También ayudarán a disminuir la sensación de necesitar orinar con frecuencia. Estos medicamentos harán que orine de color anaranjado o smith.      ¿Qué puedo hacer para prevenir elizabeth ITU?  •Consulte con roblse médico sobre los métodos anticonceptivos.Es posible que tenga que cambiar robles método si está aumentando robles riesgo de infecciones urinarias.      •Vacíe la vejiga con frecuencia.Orine y vacíe la vejiga tan pronto rupal usted sienta la necesidad. No retenga la orina por largos períodos de tiempo.      •Límpiese de adelante hacia atrás después de orinar o de tener elizabeth evacuación intestinal.[place]ord ayudará a evitar que los gérmenes entren en el tracto urinario a través de la uretra.      •[place] líquidos rupal se le haya indicado.Pregunte cuánto líquido debe cristofer cada día y cuáles líquidos son los más adecuados para usted. Es probable que usted necesite cristofer más líquidos de lo habitual para ayudar a deshacerse de la bacteria. No tome alcohol, cafeína ni jugos cítricos. Estos pueden irritar robles vejiga y aumentar jessica síntomas. Robles médico puede recomendarle el jugo de arándano para prevenir elizabeth infección urinaria.      •Orine después de tener relaciones sexuales.[place]ord puede ayudar a eliminar las bacterias que pasan susan el sexo.      •No tome duchas vaginales ni use desodorantes femeninos.Estos pueden cambiar el equilibrio químico de la vagina.      •Cambie las compresas o los tampones a menudo.[place] ayudará a evitar que los gérmenes entren en el tracto urinario.      •Consulte con robles médico sobre los métodos anticonceptivos.Es posible que tenga que cambiar robles método si está aumentando robles riesgo de infecciones urinarias.      •Use ropa interior de algodón y ropa suelta.Los pantalones ajustados y la ropa interior de nylon pueden atrapar humedad y hacer que las bacterias crezcan.      •Se puede recomendar el uso de estrógeno vaginal.[place] medicamento ayuda a prevenir las infecciones urinarias en mujeres que baker pasado por la menopausia o que están en la perimenopausia.      •Realice ejercicios para los músculos pélvicos con frecuencia.Los ejercicios para los músculos pélvicos ayudan a empezar y parar de orinar. Los músculos pélvicos eric ayudan a vaciar la vejiga más fácilmente. Apriete estos músculos firmemente susan 5 segundos rupal si estuviera tratando de retener el flujo de orina. Luego relaje por 5 segundos. Aumente gradualmente a 10 segundos. Karlos 3 series de 15 repeticiones al día o rupal se le indique.      ¿Cuándo sandor buscar atención inmediata?  •Usted está orinando muy poco o nada en absoluto.      •Usted tiene fiebre fallon con temblor y escalofríos.      •Usted tiene dolor en el costado o en la espalda que empeora.      ¿Cuándo sandor llamar a mi médico?  •Usted tiene fiebre leve.      •Usted no siente mejoría después de 2 días de cristofer los antibióticos.      •Usted tiene síntomas nuevos, tales rupal jono o pus en la orina.      •Usted está vomitando.      •Usted tiene preguntas o inquietudes acerca de robles condición o cuidado.      ACUERDOS SOBRE ROBLES CUIDADO:    Usted tiene el derecho de ayudar a planear robles cuidado. Aprenda todo lo que pueda sobre robles condición y rupal darle tratamiento. Discuta jessica opciones de tratamiento con jessica médicos para decidir el cuidado que usted desea recibir. Usted siempre tiene el derecho de rechazar el tratamiento.

## 2021-09-28 NOTE — ED ADULT TRIAGE NOTE - CHIEF COMPLAINT QUOTE
Pt presents to the ED c/o flank pain, abdominal pain, nausea and vomiting. Endorses urinary urgency and burning.

## 2021-09-28 NOTE — ED STATDOCS - PROGRESS NOTE DETAILS
pt had 5.7 cm cystic lesion right adexa on ct, us done to further vizsualize, us penidng, pt has hadtylenol for headache earlier in the night, kept updated B Nesha HUYNH Lucia HUYNH: Received s/o from Dr. Jeffries- ob/gyn consulted; no clinical concern for ovarian torsion at this time; rec empiric antibiotics for ua- keflex ordered; to f/u with Dr. Celeste in clinic in 1-2 days without fail; strict return precautions given.

## 2021-09-29 VITALS
DIASTOLIC BLOOD PRESSURE: 71 MMHG | TEMPERATURE: 98 F | HEART RATE: 71 BPM | SYSTOLIC BLOOD PRESSURE: 126 MMHG | RESPIRATION RATE: 16 BRPM | OXYGEN SATURATION: 99 %

## 2021-09-29 LAB
BASOPHILS # BLD AUTO: 0 K/UL — SIGNIFICANT CHANGE UP (ref 0–0.2)
BASOPHILS NFR BLD AUTO: 0 % — SIGNIFICANT CHANGE UP (ref 0–2)
C TRACH RRNA SPEC QL NAA+PROBE: SIGNIFICANT CHANGE UP
EOSINOPHIL # BLD AUTO: 0.25 K/UL — SIGNIFICANT CHANGE UP (ref 0–0.5)
EOSINOPHIL NFR BLD AUTO: 2 % — SIGNIFICANT CHANGE UP (ref 0–6)
LYMPHOCYTES # BLD AUTO: 3.96 K/UL — HIGH (ref 1–3.3)
LYMPHOCYTES # BLD AUTO: 32 % — SIGNIFICANT CHANGE UP (ref 13–44)
MONOCYTES # BLD AUTO: 0.37 K/UL — SIGNIFICANT CHANGE UP (ref 0–0.9)
MONOCYTES NFR BLD AUTO: 3 % — SIGNIFICANT CHANGE UP (ref 2–14)
N GONORRHOEA RRNA SPEC QL NAA+PROBE: SIGNIFICANT CHANGE UP
NEUTROPHILS # BLD AUTO: 7.17 K/UL — SIGNIFICANT CHANGE UP (ref 1.8–7.4)
NEUTROPHILS NFR BLD AUTO: 58 % — SIGNIFICANT CHANGE UP (ref 43–77)
NRBC # BLD: SIGNIFICANT CHANGE UP /100 WBCS (ref 0–0)
SPECIMEN SOURCE: SIGNIFICANT CHANGE UP

## 2021-09-29 PROCEDURE — 76830 TRANSVAGINAL US NON-OB: CPT | Mod: 26

## 2021-09-29 PROCEDURE — 76856 US EXAM PELVIC COMPLETE: CPT | Mod: 26

## 2021-09-29 PROCEDURE — 99283 EMERGENCY DEPT VISIT LOW MDM: CPT

## 2021-09-29 RX ORDER — CEPHALEXIN 500 MG
500 CAPSULE ORAL ONCE
Refills: 0 | Status: DISCONTINUED | OUTPATIENT
Start: 2021-09-29 | End: 2021-10-02

## 2021-09-29 RX ORDER — KETOROLAC TROMETHAMINE 30 MG/ML
30 SYRINGE (ML) INJECTION ONCE
Refills: 0 | Status: DISCONTINUED | OUTPATIENT
Start: 2021-09-29 | End: 2021-09-29

## 2021-09-29 RX ORDER — CEPHALEXIN 500 MG
1 CAPSULE ORAL
Qty: 14 | Refills: 0
Start: 2021-09-29 | End: 2021-10-05

## 2021-09-29 RX ORDER — ACETAMINOPHEN 500 MG
1000 TABLET ORAL ONCE
Refills: 0 | Status: COMPLETED | OUTPATIENT
Start: 2021-09-29 | End: 2021-09-29

## 2021-09-29 RX ADMIN — Medication 600 MILLIGRAM(S): at 04:05

## 2021-09-29 RX ADMIN — Medication 1000 MILLIGRAM(S): at 04:21

## 2021-09-29 RX ADMIN — Medication 1000 MILLIGRAM(S): at 04:20

## 2021-09-29 RX ADMIN — Medication 30 MILLIGRAM(S): at 07:11

## 2021-09-29 NOTE — CONSULT NOTE ADULT - ASSESSMENT
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 GYN consulted for rule out ovarian torsion. Abdominal exam is benign, patient had cervical motion tenderness and suprapubic tenderness for which a GC/Chlamydia swab and genital culture was performed. Based on benign physical exam and lab findings, it is highly unlikely that patient has an ovarian torsion and does not require intervention at this time.    Plan  - follow up outpatient with Dr. Celeste this week to continue to follow ovarian cyst  - consider empiric antibiotics for UTI  - genital culture performed to rule out BV, candida and GC/Chlamydia, will call patient with results if there are positive  - continue Tylenol and Motrin outpatient for pain and return to ED if pain is not improved with OTC medication    d/w Dr. Short

## 2021-09-29 NOTE — CONSULT NOTE ADULT - SUBJECTIVE AND OBJECTIVE BOX
TRUMAN FLETCHER is a 36y  who presented to the ED for abdominal pain. GYN consulted for rule out ovarian torsion. Patient stated that her pain started  night on the right side The pain was sharp and sudden onset, she took a Tylenol and stated that the pain improved. The day after she started to have increased pain that worsened with walking. She stated that the pain waxes and wains but is always present. The pain was gradually worsening and she started to have dysuria so she came to the ED. In the ED she received Tylenol and Motrin and stated that her pain is now a 4/10 in severity. She denies vaginal discharge, denies vaginal bleeding. Denies nausea or vomiting today, but stated that she had nausea yesterday prior to coming to the ED. She denies fevers, shortness of breath, diarrhea or constipation.     OB history:  -   - C/S x4 (, , , )  GYN history:   - no history of STDs  - last sexual intercourse Saturday  - monthly periods, LMP   - no known history of ovarian cysts or fibroids  Past medical history: none  Past surgical history: none  Medications: none  Allergies: nkda    Physical Exam  T(C): 36.9 (21 @ 20:22), Max: 36.9 (21 @ 20:22)  HR: 83 (21 @ 20:22) (83 - 83)  BP: 117/80 (21 @ 20:22) (117/80 - 117/80)  RR: 17 (21 @ 20:22) (17 - 17)  SpO2: 100% (21 @ 20:22) (100% - 100%)  General: alert and oriented x3, no acute distress  Cardiovascular: regular rate and rhythm, no murmurs, rubs or gallops  Respiratory: clear to auscultation bilaterally  Abdominal: no distension, no scarring, no bruising. Normal bowel sounds in all 4 quadrants. Slightly tender to deep palpation in the right lower quadrant. No guarding, no rebound tenderness. No CVA tenderness  Pelvic: normal external genitalia, no active vaginal bleeding, normal physiologic discharge, cervix within normal limits. No adnexal masses appreciated on exam, + for cervical motion tenderness.   Extremities: no swelling, redness or tenderness in the upper or lower extremities bilaterally. Deep tendon reflexes in upper and lower extremities bilaterally +2.     Labs:                        11.6   12.37 )-----------( 370      ( 28 Sep 2021 22:51 )             36.7         138  |  105  |  16  ----------------------------<  97  4.1   |  29  |  0.54    Ca    9.1      28 Sep 2021 22:51        HCG Quantitative, Serum: <1 mIU/mL (21 @ 22:51)    Imaging:   < from: US Transvaginal (21 @ 04:22) >  FINDINGS:    Uterus: 8.7 cm x 5.2 cm x 0.9 cm. Within normal limits.  Endometrium: 8 mm. Within normal limits.    Right ovary: 3.6 cm x 2.9 cm x 3.3 cm. Anterior to the right ovary within the next to it is a 5.9 x 2.5 x 5.4 cm heterogeneous echogenicity ovoid mass with some cystic regions and vascularity. This presumably corresponds to what was measuring cystic on the recent CT.  Left ovary: 3.4 cm x 1.8 cm x 0.9 cm. Within normal limits. Normal arterial and venous waveforms.    Fluid: None.    IMPRESSION:  Heterogeneous right adnexal mass with some cystic regions and vascularity. This is indeterminate by ultrasound and could represent a broad ligament fibroid or atypical endometrioma. Follow up with nonemergent pelvic MRI on outpatient basis is recommended for further characterization. Adjacent rightovary appears normal in size and demonstrates flow. Ovarian torsion should be excluded on a clinical basis.    --- End of Report ---    < end of copied text >    < from: CT Abdomen and Pelvis No Cont (21 @ 23:38) >  FINDINGS:  Heart is not enlarged. The visualized lungs are clear.    Evaluation of the parenchymal organs and vascular structures is limited without intravenous contrast.    There is no hydronephrosis or perinephric stranding. There is noureteral calculus identified. Punctate nonobstructing stones are seen in the right kidney. The unenhanced appearance of the liver, spleen, pancreas and adrenal glands is unremarkable. The gallbladder is contracted which limits evaluation.    Is a small large bowel are normal in caliber without evidence of obstruction. There is no free air or ascites. The appendix is normal. Sigmoid colon diverticulosis. There is no abnormal bowel wall thickening or pericolonic inflammatory change.    The abdominal aorta is normal in caliber. There is no retroperitoneal, pelvic or inguinal adenopathy.    The urinary bladder is unremarkable without bladder calculus. A 5.2 x 2.2 x 2.7 cm cystic lesion is seen in the right adnexa. Questionable mild right hydrosalpinx. The uterus and left adnexa are grossly unremarkable.    The visualized osseous structures are within normal limits.    IMPRESSION:  No hydronephrosis or perinephric stranding. Punctate nonobstructing right intrarenal calculi.    5.7 cm cystic lesion in the right adnexa.    < end of copied text >

## 2021-09-29 NOTE — ED ADULT NURSE NOTE - NSFALLRSKASSESASSIST_ED_ALL_ED
Pt presents to [facility] with cat bite to dorsal aspect left hand  Pt was bit 2 days ago by his own cat  Pt is indoor cat, cat is not vaccinated for rabies  Pt states bite was \"deep\"  Pt reports significant pain to area, with redness, swelling and some pus/shalini
no

## 2021-09-29 NOTE — ED ADULT NURSE NOTE - NS ED NOTE  TALK SOMEONE YN
DATE/TIME  (DOT-TD, DOT-NOW) CHEMO CHECK ACTIVITY (REGIMEN & DOSE CHECK, DAY, DOSE #, NAME OF CHEMO #1)  CHEMO DRUG #2  CHEMO DRUG #3 NAME OF RN #1 (USE DOT-ME HERE) NAME OF RN#2 (2ND RN TO LOG IN SEPARATELY)   10/5/2017 1:52 PM   Regimen dose double check   Victorina Lacy   Hoa Courtney    10/5/2017  4:11 PM   Vincristine/dox day 1 Etoposide day 1  Nita Foster     10/06/17  1:35 PM IT Chemo   Jaida VALLES   10/6/2017  5:04 PM   Etoposide Day #2 Vincristine/Doxorubicin Day #2  Mykel Magaña    10/7/2017  4:11 PM Etoposide Day 3 Vincristine/Doxorubicin Day 3  Martha Corley     10/8/2017  2:08 PM   Day # 4 of Etoposide, Doxorubicin/Vincristine   Victorina Georges     10/9/2017  12:44 PM   Cytoxan    Keyana Lacy     10/9/2017  2:14 PM   IT chemo   Keyana Lacy                                                                                                                                                                                  No

## 2022-03-31 ENCOUNTER — OUTPATIENT (OUTPATIENT)
Dept: OUTPATIENT SERVICES | Facility: HOSPITAL | Age: 37
LOS: 1 days | End: 2022-03-31
Payer: COMMERCIAL

## 2022-03-31 ENCOUNTER — APPOINTMENT (OUTPATIENT)
Dept: ULTRASOUND IMAGING | Facility: CLINIC | Age: 37
End: 2022-03-31
Payer: SELF-PAY

## 2022-03-31 DIAGNOSIS — Z98.891 HISTORY OF UTERINE SCAR FROM PREVIOUS SURGERY: Chronic | ICD-10-CM

## 2022-03-31 DIAGNOSIS — N64.4 MASTODYNIA: ICD-10-CM

## 2022-03-31 DIAGNOSIS — Z00.8 ENCOUNTER FOR OTHER GENERAL EXAMINATION: ICD-10-CM

## 2022-03-31 PROCEDURE — 76642 ULTRASOUND BREAST LIMITED: CPT

## 2022-03-31 PROCEDURE — 76642 ULTRASOUND BREAST LIMITED: CPT | Mod: 26,LT

## 2022-04-01 PROBLEM — Z00.00 ENCOUNTER FOR PREVENTIVE HEALTH EXAMINATION: Noted: 2022-04-01

## 2022-04-26 ENCOUNTER — EMERGENCY (EMERGENCY)
Facility: HOSPITAL | Age: 37
LOS: 0 days | Discharge: HOME | End: 2022-04-26
Attending: EMERGENCY MEDICINE
Payer: MEDICAID

## 2022-04-26 VITALS
OXYGEN SATURATION: 98 % | DIASTOLIC BLOOD PRESSURE: 88 MMHG | HEART RATE: 70 BPM | RESPIRATION RATE: 14 BRPM | SYSTOLIC BLOOD PRESSURE: 127 MMHG | TEMPERATURE: 98 F

## 2022-04-26 VITALS — WEIGHT: 179.9 LBS | HEIGHT: 64 IN

## 2022-04-26 DIAGNOSIS — R50.9 FEVER, UNSPECIFIED: ICD-10-CM

## 2022-04-26 DIAGNOSIS — J06.9 ACUTE UPPER RESPIRATORY INFECTION, UNSPECIFIED: ICD-10-CM

## 2022-04-26 DIAGNOSIS — R07.0 PAIN IN THROAT: ICD-10-CM

## 2022-04-26 DIAGNOSIS — Z20.822 CONTACT WITH AND (SUSPECTED) EXPOSURE TO COVID-19: ICD-10-CM

## 2022-04-26 DIAGNOSIS — Z98.891 HISTORY OF UTERINE SCAR FROM PREVIOUS SURGERY: Chronic | ICD-10-CM

## 2022-04-26 DIAGNOSIS — R51.9 HEADACHE, UNSPECIFIED: ICD-10-CM

## 2022-04-26 LAB
ALBUMIN SERPL ELPH-MCNC: 3.2 G/DL — LOW (ref 3.3–5)
ALP SERPL-CCNC: 81 U/L — SIGNIFICANT CHANGE UP (ref 40–120)
ALT FLD-CCNC: 22 U/L — SIGNIFICANT CHANGE UP (ref 12–78)
ANION GAP SERPL CALC-SCNC: 7 MMOL/L — SIGNIFICANT CHANGE UP (ref 5–17)
APPEARANCE UR: CLEAR — SIGNIFICANT CHANGE UP
AST SERPL-CCNC: 15 U/L — SIGNIFICANT CHANGE UP (ref 15–37)
BASOPHILS # BLD AUTO: 0.05 K/UL — SIGNIFICANT CHANGE UP (ref 0–0.2)
BASOPHILS NFR BLD AUTO: 0.5 % — SIGNIFICANT CHANGE UP (ref 0–2)
BILIRUB SERPL-MCNC: 0.4 MG/DL — SIGNIFICANT CHANGE UP (ref 0.2–1.2)
BILIRUB UR-MCNC: NEGATIVE — SIGNIFICANT CHANGE UP
BUN SERPL-MCNC: 16 MG/DL — SIGNIFICANT CHANGE UP (ref 7–23)
CALCIUM SERPL-MCNC: 8.8 MG/DL — SIGNIFICANT CHANGE UP (ref 8.5–10.1)
CHLORIDE SERPL-SCNC: 107 MMOL/L — SIGNIFICANT CHANGE UP (ref 96–108)
CO2 SERPL-SCNC: 27 MMOL/L — SIGNIFICANT CHANGE UP (ref 22–31)
COLOR SPEC: YELLOW — SIGNIFICANT CHANGE UP
CREAT SERPL-MCNC: 0.76 MG/DL — SIGNIFICANT CHANGE UP (ref 0.5–1.3)
DIFF PNL FLD: ABNORMAL
EGFR: 103 ML/MIN/1.73M2 — SIGNIFICANT CHANGE UP
EOSINOPHIL # BLD AUTO: 0.19 K/UL — SIGNIFICANT CHANGE UP (ref 0–0.5)
EOSINOPHIL NFR BLD AUTO: 1.7 % — SIGNIFICANT CHANGE UP (ref 0–6)
GLUCOSE SERPL-MCNC: 99 MG/DL — SIGNIFICANT CHANGE UP (ref 70–99)
GLUCOSE UR QL: NEGATIVE — SIGNIFICANT CHANGE UP
HCG SERPL-ACNC: <1 — SIGNIFICANT CHANGE UP
HCT VFR BLD CALC: 35.9 % — SIGNIFICANT CHANGE UP (ref 34.5–45)
HGB BLD-MCNC: 11.4 G/DL — LOW (ref 11.5–15.5)
IMM GRANULOCYTES NFR BLD AUTO: 1 % — SIGNIFICANT CHANGE UP (ref 0–1.5)
KETONES UR-MCNC: NEGATIVE — SIGNIFICANT CHANGE UP
LEUKOCYTE ESTERASE UR-ACNC: NEGATIVE — SIGNIFICANT CHANGE UP
LYMPHOCYTES # BLD AUTO: 3.47 K/UL — HIGH (ref 1–3.3)
LYMPHOCYTES # BLD AUTO: 31.3 % — SIGNIFICANT CHANGE UP (ref 13–44)
MCHC RBC-ENTMCNC: 26.7 PG — LOW (ref 27–34)
MCHC RBC-ENTMCNC: 31.8 GM/DL — LOW (ref 32–36)
MCV RBC AUTO: 84.1 FL — SIGNIFICANT CHANGE UP (ref 80–100)
MONOCYTES # BLD AUTO: 0.52 K/UL — SIGNIFICANT CHANGE UP (ref 0–0.9)
MONOCYTES NFR BLD AUTO: 4.7 % — SIGNIFICANT CHANGE UP (ref 2–14)
NEUTROPHILS # BLD AUTO: 6.75 K/UL — SIGNIFICANT CHANGE UP (ref 1.8–7.4)
NEUTROPHILS NFR BLD AUTO: 60.8 % — SIGNIFICANT CHANGE UP (ref 43–77)
NITRITE UR-MCNC: NEGATIVE — SIGNIFICANT CHANGE UP
PH UR: 7 — SIGNIFICANT CHANGE UP (ref 5–8)
PLATELET # BLD AUTO: 398 K/UL — SIGNIFICANT CHANGE UP (ref 150–400)
POTASSIUM SERPL-MCNC: 3.6 MMOL/L — SIGNIFICANT CHANGE UP (ref 3.5–5.3)
POTASSIUM SERPL-SCNC: 3.6 MMOL/L — SIGNIFICANT CHANGE UP (ref 3.5–5.3)
PROT SERPL-MCNC: 6.9 GM/DL — SIGNIFICANT CHANGE UP (ref 6–8.3)
PROT UR-MCNC: 30 MG/DL
RAPID RVP RESULT: SIGNIFICANT CHANGE UP
RBC # BLD: 4.27 M/UL — SIGNIFICANT CHANGE UP (ref 3.8–5.2)
RBC # FLD: 13.6 % — SIGNIFICANT CHANGE UP (ref 10.3–14.5)
SARS-COV-2 RNA SPEC QL NAA+PROBE: SIGNIFICANT CHANGE UP
SODIUM SERPL-SCNC: 141 MMOL/L — SIGNIFICANT CHANGE UP (ref 135–145)
SP GR SPEC: 1.01 — SIGNIFICANT CHANGE UP (ref 1.01–1.02)
TROPONIN I, HIGH SENSITIVITY RESULT: <3 NG/L — SIGNIFICANT CHANGE UP
UROBILINOGEN FLD QL: NEGATIVE — SIGNIFICANT CHANGE UP
WBC # BLD: 11.09 K/UL — HIGH (ref 3.8–10.5)
WBC # FLD AUTO: 11.09 K/UL — HIGH (ref 3.8–10.5)

## 2022-04-26 PROCEDURE — 71045 X-RAY EXAM CHEST 1 VIEW: CPT

## 2022-04-26 PROCEDURE — 36415 COLL VENOUS BLD VENIPUNCTURE: CPT

## 2022-04-26 PROCEDURE — G1004: CPT

## 2022-04-26 PROCEDURE — 87086 URINE CULTURE/COLONY COUNT: CPT

## 2022-04-26 PROCEDURE — 93005 ELECTROCARDIOGRAM TRACING: CPT

## 2022-04-26 PROCEDURE — 99285 EMERGENCY DEPT VISIT HI MDM: CPT | Mod: 25

## 2022-04-26 PROCEDURE — 81001 URINALYSIS AUTO W/SCOPE: CPT

## 2022-04-26 PROCEDURE — 85025 COMPLETE CBC W/AUTO DIFF WBC: CPT

## 2022-04-26 PROCEDURE — 71045 X-RAY EXAM CHEST 1 VIEW: CPT | Mod: 26

## 2022-04-26 PROCEDURE — 84702 CHORIONIC GONADOTROPIN TEST: CPT

## 2022-04-26 PROCEDURE — 96374 THER/PROPH/DIAG INJ IV PUSH: CPT

## 2022-04-26 PROCEDURE — 70450 CT HEAD/BRAIN W/O DYE: CPT | Mod: MG

## 2022-04-26 PROCEDURE — 70450 CT HEAD/BRAIN W/O DYE: CPT | Mod: 26,MG

## 2022-04-26 PROCEDURE — 84484 ASSAY OF TROPONIN QUANT: CPT

## 2022-04-26 PROCEDURE — 0225U NFCT DS DNA&RNA 21 SARSCOV2: CPT

## 2022-04-26 PROCEDURE — 99285 EMERGENCY DEPT VISIT HI MDM: CPT

## 2022-04-26 PROCEDURE — 80053 COMPREHEN METABOLIC PANEL: CPT

## 2022-04-26 PROCEDURE — 93010 ELECTROCARDIOGRAM REPORT: CPT

## 2022-04-26 RX ORDER — SODIUM CHLORIDE 9 MG/ML
1000 INJECTION INTRAMUSCULAR; INTRAVENOUS; SUBCUTANEOUS ONCE
Refills: 0 | Status: COMPLETED | OUTPATIENT
Start: 2022-04-26 | End: 2022-04-26

## 2022-04-26 RX ORDER — KETOROLAC TROMETHAMINE 30 MG/ML
30 SYRINGE (ML) INJECTION ONCE
Refills: 0 | Status: DISCONTINUED | OUTPATIENT
Start: 2022-04-26 | End: 2022-04-26

## 2022-04-26 RX ADMIN — SODIUM CHLORIDE 1000 MILLILITER(S): 9 INJECTION INTRAMUSCULAR; INTRAVENOUS; SUBCUTANEOUS at 02:01

## 2022-04-26 RX ADMIN — Medication 30 MILLIGRAM(S): at 02:54

## 2022-04-26 NOTE — ED PROVIDER NOTE - PROGRESS NOTE DETAILS
will give toradol stat.   will not hold for HCG.   pt denies any pregnancy pt told of results.   agree with plan for d/c home with f/u

## 2022-04-26 NOTE — ED PROVIDER NOTE - OBJECTIVE STATEMENT
PI# [id number] - 38 y/o female in ED c/o fever, HA, sore throat, ear pain, cp, sob and myalgia x 4 days.   pt states taking meds from [facility] with no relief.   no sick contacts.   tolerating PO.   pt denies any n/v/d/abd pain.   states COVID vaccinated with no exposure.   states started menstruation today

## 2022-04-26 NOTE — ED ADULT NURSE NOTE - OBJECTIVE STATEMENT
Pt. is A&Ox3, presenting to the ER c/o flu-like-symptoms.  Renetta #[id number]. Pt. states she's had a right sided headache and chest pain x4days. Reports "I have been taking Excedrin with mild relieve. The pain was so bad my sister gave me an injection from [place] called Providoce." Denies fevers, abdominal pain, N/V/D, lightheadedness or dizziness. States the pain in her chest radiates to the middle of her back. Last dose of Excedrin was yesterday. Pt. is A&Ox3, presenting to the ER c/o flu-like-symptoms.  Renetta #[id number]. Pt. states she's had a right sided headache and chest pain x4days. Reports "I have been taking Excedrin with mild relieve. The pain was so bad my sister gave me an injection from [place] called Providoce." Denies fevers, abdominal pain, N/V/D, lightheadedness or dizziness. States the pain in her chest radiates to the middle of her back. Last dose of Excedrin was yesterday. Patient was diagnosed with UTI on 4/14 and placed on antibiotics.

## 2022-04-26 NOTE — ED ADULT TRIAGE NOTE - CHIEF COMPLAINT QUOTE
p/w body aches, fever, HA, chills, SOB. denies sick contacts. breathing spontaneous and unlabored, SpO2 98% on RA.

## 2022-04-26 NOTE — ED ADULT NURSE REASSESSMENT NOTE - NS ED NURSE REASSESS COMMENT FT1
Blood work and swab sent to lab. Awaiting results of the [facility] prior to medicating patient.

## 2022-04-26 NOTE — ED ADULT NURSE NOTE - NS ED NURSE IV DC DT
BRIEF OPERATIVE NOTE    Date of Procedure: 6/15/2017   Preoperative Diagnosis: Atherosclerosis of native coronary artery of native heart with [facility] [I25.110]  Postoperative Diagnosis: Atherosclerosis of native coronary artery of native heart with unstable angina pectoris ([facility] [I25.110]    Procedure(s):  LYSIS OF ADHESIONS  CORONARY ARTERY BYPASS GRAFT (CABG X 2)/ JEAN-BAPTISTE    LIMA to LAD, RSVG to OM    L Greater saphenous VEIN HARVEST  ESOPHAGEAL TRANS ECHOCARDIOGRAM  Surgeon(s) and Role:     * Joyce Perez MD - Primary         Assistant Staff:       Surgical Staff:  Circ-1: Cait Kulkarni RN  Circ-Relief: Christian Cleaning RN  Perfusionist: Devika Rogers  Scrub Tech-1: Leatha Harp  Scrub Tech-Relief: Rafaela Orellana  Scrub RN-1: Julissa Quan RN  Event Time In   Incision Start 7310   Incision Close 1150     Anesthesia: General   Estimated Blood Loss: Minimal  Specimens: * No specimens in log *   Findings: Good LIMA and saphenous vein. Severe adhesions from prior pericardial window making for difficult target ID. OM good target. LAD 1.4mm. Soft aorta. Good LV.  Good flow all grafts  Complications: Minimal  Implants: * No implants in log * 26-Apr-2022 04:56

## 2022-04-26 NOTE — ED PROVIDER NOTE - PATIENT PORTAL LINK FT
You can access the FollowMyHealth Patient Portal offered by [facility] by registering at the following website: [email or web address]. By joining [employer]’s FollowMyHealth portal, you will also be able to view your health information using other applications (apps) compatible with our system.

## 2022-04-27 LAB
CULTURE RESULTS: SIGNIFICANT CHANGE UP
SPECIMEN SOURCE: SIGNIFICANT CHANGE UP

## 2022-06-13 PROBLEM — Z78.9 OTHER SPECIFIED HEALTH STATUS: Chronic | Status: ACTIVE | Noted: 2019-05-03

## 2022-09-22 ENCOUNTER — EMERGENCY (EMERGENCY)
Facility: HOSPITAL | Age: 37
LOS: 0 days | Discharge: HOME | End: 2022-09-23
Attending: EMERGENCY MEDICINE
Payer: MEDICAID

## 2022-09-22 VITALS — HEIGHT: 64 IN

## 2022-09-22 DIAGNOSIS — G44.009 CLUSTER HEADACHE SYNDROME, UNSPECIFIED, NOT INTRACTABLE: ICD-10-CM

## 2022-09-22 DIAGNOSIS — M54.2 CERVICALGIA: ICD-10-CM

## 2022-09-22 DIAGNOSIS — R51.9 HEADACHE, UNSPECIFIED: ICD-10-CM

## 2022-09-22 DIAGNOSIS — H92.01 OTALGIA, RIGHT EAR: ICD-10-CM

## 2022-09-22 DIAGNOSIS — Z98.891 HISTORY OF UTERINE SCAR FROM PREVIOUS SURGERY: Chronic | ICD-10-CM

## 2022-09-22 PROCEDURE — 84702 CHORIONIC GONADOTROPIN TEST: CPT

## 2022-09-22 PROCEDURE — 99284 EMERGENCY DEPT VISIT MOD MDM: CPT

## 2022-09-22 PROCEDURE — 96374 THER/PROPH/DIAG INJ IV PUSH: CPT

## 2022-09-22 PROCEDURE — 96375 TX/PRO/DX INJ NEW DRUG ADDON: CPT

## 2022-09-22 PROCEDURE — 36415 COLL VENOUS BLD VENIPUNCTURE: CPT

## 2022-09-22 PROCEDURE — 99284 EMERGENCY DEPT VISIT MOD MDM: CPT | Mod: 25

## 2022-09-22 NOTE — ED ADULT TRIAGE NOTE - CHIEF COMPLAINT QUOTE
c/o right sided facial pain and right ear pain started 3 days ago with intermittent headaches, c/o pain under right eye, denies fever, cough or acute injury, took tylenol at 4pm today with no relief in symptoms HX: denies

## 2022-09-23 VITALS
DIASTOLIC BLOOD PRESSURE: 79 MMHG | SYSTOLIC BLOOD PRESSURE: 120 MMHG | TEMPERATURE: 98 F | HEART RATE: 66 BPM | OXYGEN SATURATION: 99 % | RESPIRATION RATE: 17 BRPM

## 2022-09-23 PROBLEM — Z78.9 OTHER SPECIFIED HEALTH STATUS: Chronic | Status: ACTIVE | Noted: 2020-03-19

## 2022-09-23 LAB — HCG SERPL-ACNC: <1 MIU/ML — SIGNIFICANT CHANGE UP

## 2022-09-23 RX ORDER — KETOROLAC TROMETHAMINE 30 MG/ML
15 SYRINGE (ML) INJECTION ONCE
Refills: 0 | Status: DISCONTINUED | OUTPATIENT
Start: 2022-09-23 | End: 2022-09-23

## 2022-09-23 RX ORDER — DIPHENHYDRAMINE HCL 50 MG
25 CAPSULE ORAL ONCE
Refills: 0 | Status: COMPLETED | OUTPATIENT
Start: 2022-09-23 | End: 2022-09-23

## 2022-09-23 RX ORDER — METOCLOPRAMIDE HCL 10 MG
10 TABLET ORAL ONCE
Refills: 0 | Status: COMPLETED | OUTPATIENT
Start: 2022-09-23 | End: 2022-09-23

## 2022-09-23 RX ORDER — SODIUM CHLORIDE 9 MG/ML
1000 INJECTION INTRAMUSCULAR; INTRAVENOUS; SUBCUTANEOUS ONCE
Refills: 0 | Status: COMPLETED | OUTPATIENT
Start: 2022-09-23 | End: 2022-09-23

## 2022-09-23 RX ADMIN — SODIUM CHLORIDE 2000 MILLILITER(S): 9 INJECTION INTRAMUSCULAR; INTRAVENOUS; SUBCUTANEOUS at 01:48

## 2022-09-23 RX ADMIN — Medication 10 MILLIGRAM(S): at 01:48

## 2022-09-23 RX ADMIN — Medication 15 MILLIGRAM(S): at 01:48

## 2022-09-23 RX ADMIN — Medication 25 MILLIGRAM(S): at 01:49

## 2022-09-23 NOTE — ED PROVIDER NOTE - NSFOLLOWUPINSTRUCTIONS_ED_ALL_ED_FT
Cefalea en brotes  Cluster Headache    La cefalea en brotes es un tipo de cefalea primaria que produce un dolor de eedn profundo e intenso. Las cefaleas en brotes pueden durar entre 15 minutos y 3 horas. Por lo general, aparecen en un lado de la eden o el tez. Pueden aparecer del otro lado de la eden cuando comienza elizabeth nueva cefalea en brotes.    Las cefaleas en brotes ocurren repetidas veces susan semanas o meses. Pueden aparecer varias veces al día. Generalmente, ocurren en el mismo momento del día, a menudo por la noche. Ocurren con mayor frecuencia en otoño y en primavera.    ¿Cuáles son las causas?  Se desconoce la causa de esta afección. A diferencia de la migraña y la cefalea tensional, la cefalea en brotes generalmente no está asociada a factores desencadenantes rupal alimentos, cambios hormonales o estrés.    ¿Qué incrementa el riesgo?  Los siguientes factores pueden hacer que sea propenso a desarrollar esta afección:    Ser hombre y tener entre 20 y 50 años.  Consumir o usar productos que contienen nicotina o tabaco.  Tener niveles altos de histamina. Lennon puede suceder en personas que tienen alergias.  Cristofer medicamentos que hacen que los vasos sanguíneos se expandan, rupal la nitroglicerina.  Tener un padre o janiya que tenga cefaleas en brotes.    ¿Cuáles son los signos o síntomas?  Los síntomas de esta afección incluyen:    Dolor muy intenso en un lado de la eden que comienza detrás o alrededor del kyle o la sien, jenniffer puede extenderse a la magy, la eden y el kobe.  Náuseas.  Sensibilidad a la mariela.  Secreción nasal y congestión nasal.  Sudoración en la frente o el tez en el lado afectado.  Párpado caído o hinchado, enrojecimiento del kyle o lagrimeo del lado afectado.  Inquietud y agitación.  Piel pálida (palidez) o enrojecimiento en la magy.    ¿Cómo se diagnostica?  Esta afección se puede diagnosticar en función de lo siguiente:    La descripción de los ataques, incluidos el dolor, la ubicación y la gravedad de los concepcion de eden y los síntomas. El médico también puede preguntarle con qué frecuencia ocurren los concepcion de eden y cuánto monsivais.  Un examen neurológico para detectar signos físicos de un trastorno neurológico. El médico le controlará los sentidos, los reflejos y los nervios. El examen generalmente es normal en las personas que tienen cefaleas en brotes.  Estudios. Es posible que el médico indique pruebas adicionales para fernando si la causa de los concepcion de eden es otra enfermedad. Estas pruebas podrían indicar que usted no tiene cefaleas en brotes. Las pruebas pueden incluir las siguientes:    Resonancia magnética (RM).  Exploración por tomografía computarizada (TC).  Análisis de jono.    ¿Cómo se trata?  El tratamiento de esta afección puede incluir:    Medicamentos para aliviar el dolor y para prevenir los ataques repetidos. Algunas personas necesitarán elizabeth combinación de medicamentos.  Oxígeno que se inhala a través de elizabeth máscara. Lennon ayuda a aliviar el dolor de un ataque en 15 a 20 minutos.    Siga estas instrucciones en hart casa:      Registro diario de cefaleas     Lleve un registro diario de cefaleas rupal se lo haya indicado hart médico. Lennon puede ayudarlos a usted y al médico a determinar qué factores desencadenan las cefaleas. En el registro diario de cefaleas, incluya información sobre lo siguiente:    La hora del día en que comenzó la cefalea y qué estaba haciendo usted en sheng momento.  Cuánto tiempo duró la cefalea.  Dónde comenzó el dolor y si se trasladó a otras zonas.  El tipo de dolor, por ejemplo, urente, punzante, pulsátil o de tipo calambre.  El mateo de dolor. Use elizabeth escala de dolor y califique el dolor con un número del 1 (leve) al 10 (intenso).  El tratamiento que utilizó y cualquier cambio en los síntomas tras el tratamiento.        Medicamentos    Use los medicamentos de venta emmanuel y los recetados solamente rupal se lo haya indicado el médico.  Pregúntele al médico si el medicamento recetado:    Hace necesario que evite conducir o usar maquinaria pesada.  Puede causarle estreñimiento. Es posible que tenga que cristofer estas medidas para prevenir o tratar el estreñimiento:    Beber suficiente líquido rupal para mantener la orina de color amarillo pálido.  Usar medicamentos recetados o de venta emmanuel.  Consumir alimentos ricos en fibra, rupal frijoles, cereales integrales, y frutas y verduras frescas.  Limitar el consumo de alimentos ricos en grasa y azúcares procesados, rupal los alimentos fritos o dulces.        Estilo de love     Siga un patrón de sueño regular. No cambie la hora a la que se acuesta ni varíe la cantidad de tiempo que duerme de un día al otro. Es importante que siga el mismo patrón susan el período de brotes para ayudar a prevenir las cefaleas. Duerma entre 7 y 9 horas todas las noches o la cantidad de horas que le haya recomendado el médico.  Limite o controle el estrés. Considere opciones para aliviar el estrés, rupal acupuntura, psicoterapia, biorregulación y masajes. Hable con el médico respecto de qué método podría ser adecuado para hart warner.  Karlos ejercicio regularmente. Realice al menos 30 minutos de actividad física diaria, 5 veces por semana. El ejercicio moderado puede ser lo mejor.  Siga elizabeth dieta saludable y evite los alimentos específicos que puedan desencadenar las cefaleas.  No baldo alcohol. Beber alcohol puede desencadenar rápidamente un dolor de eden intenso.  No consuma ningún producto que contenga nicotina o tabaco, rupal cigarrillos, cigarrillos electrónicos y tabaco de mascar. Si necesita ayuda para dejar de consumir estos productos, consulte al médico.        Instrucciones generales    Use el oxígeno rupal se lo haya indicado el médico.  Concurra a todas las visitas de seguimiento rupal se lo haya indicado el médico. Lennon es importante.    Comuníquese con un médico si:  Las cefaleas cambian, se tornan más intensas o se producen con mayor frecuencia.  El medicamento o el oxígeno que el médico le recomendó no lo ayuda.    Busque ayuda de inmediato si:  Se desmaya.  Siente debilidad o adormecimiento, especialmente en un lado del cuerpo o el tez.  Tiene visión doble.  Tiene náuseas o vómitos que no desaparecen en el transcurso de varias horas.  Tiene dificultad para hablar, caminar o mantener el equilibrio.  Tiene dolor o rigidez en el kobe y tiene fiebre.    Resumen  La cefalea en brotes es un tipo de cefalea primaria que produce un dolor de eden profundo e intenso, por lo general, de un lado de la eden.  Lleve un registro diario de cefaleas para ayudar a determinar cuáles son los factores que las desencadenan.  Evitar el alcohol y ciertos medicamentos puede ayudar a prevenir las cefaleas en brotes.  Hay muchos tratamientos para la cefalea en brotes, incluidos el oxígeno, los medicamentos para detener la cefalea y los medicamentos para prevenir las cefaleas. Hable con hart médico acerca de las opciones de tratamiento.    NOTAS ADICIONALES E INSTRUCCIONES    Please follow up with your Primary MD in 24-48 hr.  Seek immediate medical care for any new/worsening signs or symptoms.

## 2022-09-23 NOTE — ED PROVIDER NOTE - OBJECTIVE STATEMENT
37-year-old female no pertinent past medical history presents with episodic right-sided headache with retrobulbar pain radiated into the right parietal region.  Patient also notes some ear discomfort on the right side.  Patient denies any neurological deficits but has photophobia on the right side.  She has not taken anything for the pain within the last 8 hours.  Of note she has had a recent head CT that was unremarkable.

## 2022-09-23 NOTE — ED ADULT NURSE NOTE - OBJECTIVE STATEMENT
c/o right sided facial pain and right ear pain started 3 days ago with intermittent headaches, c/o pain under right eye, denies fever, cough or acute injury, took tylenol at 4pm today with no relief in symptoms HX: denies any other medical complaints

## 2022-09-23 NOTE — ED PROVIDER NOTE - CLINICAL SUMMARY MEDICAL DECISION MAKING FREE TEXT BOX
Pt with h/o episodic headache with right retrobulbar pain and normal neuro exam c/w cluster vs migraine headache.  She previously had a normal CT head.  Will tx symptoms with migraine cocktail and reassess.

## 2022-09-23 NOTE — ED PROVIDER NOTE - CARE PROVIDER_API CALL
Kalyn Vázquez (MD)  Clinical Neurophysiology; EEGEpilepsy; Neurology; Sleep Medicine  [address] [facility], [place] [address]  Phone: [phone]  Fax: [phone]  Follow Up Time: 1-3 Days

## 2022-09-23 NOTE — ED PROVIDER NOTE - PATIENT PORTAL LINK FT
You can access the FollowMyHealth Patient Portal offered by [facility] by registering at the following website: [email or web address]. By joining [employer]’s FollowMyHealth portal, you will also be able to view your health information using other applications (apps) compatible with our system.

## 2022-09-23 NOTE — ED PROVIDER NOTE - IV ALTEPLASE DOOR HIDDEN
Review of chart for any potential discharge needs. No needs identified for discharge intervention at this time. MD and bedside RN  if needs arise please consult case management for discharge intervention. CM not following at this time. show

## 2022-11-25 ENCOUNTER — APPOINTMENT (OUTPATIENT)
Dept: ULTRASOUND IMAGING | Facility: CLINIC | Age: 37
End: 2022-11-25
Payer: SELF-PAY

## 2022-11-25 ENCOUNTER — OUTPATIENT (OUTPATIENT)
Dept: OUTPATIENT SERVICES | Facility: HOSPITAL | Age: 37
LOS: 1 days | End: 2022-11-25
Payer: SELF-PAY

## 2022-11-25 DIAGNOSIS — Z98.891 HISTORY OF UTERINE SCAR FROM PREVIOUS SURGERY: Chronic | ICD-10-CM

## 2022-11-25 DIAGNOSIS — Z00.8 ENCOUNTER FOR OTHER GENERAL EXAMINATION: ICD-10-CM

## 2022-11-25 PROCEDURE — 76830 TRANSVAGINAL US NON-OB: CPT | Mod: 26

## 2022-11-25 PROCEDURE — 76700 US EXAM ABDOM COMPLETE: CPT

## 2022-11-25 PROCEDURE — 76830 TRANSVAGINAL US NON-OB: CPT

## 2022-11-25 PROCEDURE — 76856 US EXAM PELVIC COMPLETE: CPT

## 2022-11-25 PROCEDURE — 76700 US EXAM ABDOM COMPLETE: CPT | Mod: 26

## 2022-11-25 PROCEDURE — 76856 US EXAM PELVIC COMPLETE: CPT | Mod: 26

## 2023-02-24 NOTE — ED PROVIDER NOTE - IV ALTEPLASE EXCL ABS HIDDEN
"Gastroenterology Pre-Procedure Note    Genny Reyes MRN#  [id number]   Age:  45 year old YOB: 1977    Date of Admission:  2/24/2023     PROCEDURE   Date of Procedure: 2/24/2023  Primary care provider: Elizabeth Leslie  Type of Endoscopy: colonoscopy  Reason for Procedure: screening   Type of Anesthesia Anticipated: Moderate (conscious) sedation     SUBJECTIVE   Genny is a 45 year old female who will be undergoing the above procedure.      A history and physical has been performed. The patient's medications and allergies have been reviewed. The risks and benefits of the procedure and the sedation options and risks were discussed with the patient.  All questions were answered and informed consent was obtained.         OBJECTIVE   Vitals /83   Pulse 78   Temp 97.3  F (36.3  C) (Temporal)   Resp 12   Ht 1.557 m (5' 1.3\")   Wt 72.6 kg (160 lb)   SpO2 96%   BMI 29.94 kg/m          General:   Well-developed  in NAD.   HEENT:   NC/AT.    Lungs:  No respiratory distress.   Heart:  RRR   Abdomen:   Soft. NT/ND. BS active.    Ext/MS:   No cyanosis or erythema.    Neuro:   No focal deficits noted.    Psych:  Appears to have normal affect and mood.           IMPRESSION   ASA Class 2 - Mild systemic disease    Proceed with above stated procedure with sedation plan as described.       Shine Tsai MD  [facility]  [phone]        " show

## 2023-05-30 ENCOUNTER — EMERGENCY (EMERGENCY)
Facility: HOSPITAL | Age: 38
LOS: 0 days | Discharge: HOME | End: 2023-05-30
Attending: EMERGENCY MEDICINE
Payer: MEDICAID

## 2023-05-30 VITALS
HEART RATE: 90 BPM | RESPIRATION RATE: 18 BRPM | TEMPERATURE: 99 F | OXYGEN SATURATION: 97 % | DIASTOLIC BLOOD PRESSURE: 73 MMHG | SYSTOLIC BLOOD PRESSURE: 125 MMHG

## 2023-05-30 VITALS — HEIGHT: 62 IN | WEIGHT: 139.99 LBS

## 2023-05-30 DIAGNOSIS — Z98.891 HISTORY OF UTERINE SCAR FROM PREVIOUS SURGERY: Chronic | ICD-10-CM

## 2023-05-30 DIAGNOSIS — R50.9 FEVER, UNSPECIFIED: ICD-10-CM

## 2023-05-30 DIAGNOSIS — J02.9 ACUTE PHARYNGITIS, UNSPECIFIED: ICD-10-CM

## 2023-05-30 DIAGNOSIS — Z20.822 CONTACT WITH AND (SUSPECTED) EXPOSURE TO COVID-19: ICD-10-CM

## 2023-05-30 DIAGNOSIS — R00.0 TACHYCARDIA, UNSPECIFIED: ICD-10-CM

## 2023-05-30 DIAGNOSIS — H92.03 OTALGIA, BILATERAL: ICD-10-CM

## 2023-05-30 DIAGNOSIS — R30.0 DYSURIA: ICD-10-CM

## 2023-05-30 DIAGNOSIS — Z87.59 PERSONAL HISTORY OF OTHER COMPLICATIONS OF PREGNANCY, CHILDBIRTH AND THE PUERPERIUM: ICD-10-CM

## 2023-05-30 DIAGNOSIS — B34.9 VIRAL INFECTION, UNSPECIFIED: ICD-10-CM

## 2023-05-30 LAB
ALBUMIN SERPL ELPH-MCNC: 3.4 G/DL — SIGNIFICANT CHANGE UP (ref 3.3–5)
ALP SERPL-CCNC: 92 U/L — SIGNIFICANT CHANGE UP (ref 40–120)
ALT FLD-CCNC: 27 U/L — SIGNIFICANT CHANGE UP (ref 12–78)
ANION GAP SERPL CALC-SCNC: 6 MMOL/L — SIGNIFICANT CHANGE UP (ref 5–17)
APPEARANCE UR: CLEAR — SIGNIFICANT CHANGE UP
AST SERPL-CCNC: 16 U/L — SIGNIFICANT CHANGE UP (ref 15–37)
BASOPHILS # BLD AUTO: 0.05 K/UL — SIGNIFICANT CHANGE UP (ref 0–0.2)
BASOPHILS NFR BLD AUTO: 0.3 % — SIGNIFICANT CHANGE UP (ref 0–2)
BILIRUB SERPL-MCNC: 0.5 MG/DL — SIGNIFICANT CHANGE UP (ref 0.2–1.2)
BILIRUB UR-MCNC: NEGATIVE — SIGNIFICANT CHANGE UP
BUN SERPL-MCNC: 7 MG/DL — SIGNIFICANT CHANGE UP (ref 7–23)
CALCIUM SERPL-MCNC: 9.1 MG/DL — SIGNIFICANT CHANGE UP (ref 8.5–10.1)
CHLORIDE SERPL-SCNC: 106 MMOL/L — SIGNIFICANT CHANGE UP (ref 96–108)
CO2 SERPL-SCNC: 24 MMOL/L — SIGNIFICANT CHANGE UP (ref 22–31)
COLOR SPEC: YELLOW — SIGNIFICANT CHANGE UP
CREAT SERPL-MCNC: 0.48 MG/DL — LOW (ref 0.5–1.3)
DIFF PNL FLD: ABNORMAL
EGFR: 123 ML/MIN/1.73M2 — SIGNIFICANT CHANGE UP
EOSINOPHIL # BLD AUTO: 0.05 K/UL — SIGNIFICANT CHANGE UP (ref 0–0.5)
EOSINOPHIL NFR BLD AUTO: 0.3 % — SIGNIFICANT CHANGE UP (ref 0–6)
GLUCOSE SERPL-MCNC: 99 MG/DL — SIGNIFICANT CHANGE UP (ref 70–99)
GLUCOSE UR QL: NEGATIVE — SIGNIFICANT CHANGE UP
HCG SERPL-ACNC: <1 MIU/ML — SIGNIFICANT CHANGE UP
HCT VFR BLD CALC: 38.8 % — SIGNIFICANT CHANGE UP (ref 34.5–45)
HGB BLD-MCNC: 12.6 G/DL — SIGNIFICANT CHANGE UP (ref 11.5–15.5)
IMM GRANULOCYTES NFR BLD AUTO: 1.3 % — HIGH (ref 0–0.9)
KETONES UR-MCNC: NEGATIVE — SIGNIFICANT CHANGE UP
LEUKOCYTE ESTERASE UR-ACNC: NEGATIVE — SIGNIFICANT CHANGE UP
LIDOCAIN IGE QN: 71 U/L — LOW (ref 73–393)
LYMPHOCYTES # BLD AUTO: 1.2 K/UL — SIGNIFICANT CHANGE UP (ref 1–3.3)
LYMPHOCYTES # BLD AUTO: 7.6 % — LOW (ref 13–44)
MCHC RBC-ENTMCNC: 26.9 PG — LOW (ref 27–34)
MCHC RBC-ENTMCNC: 32.5 GM/DL — SIGNIFICANT CHANGE UP (ref 32–36)
MCV RBC AUTO: 82.9 FL — SIGNIFICANT CHANGE UP (ref 80–100)
MONOCYTES # BLD AUTO: 0.5 K/UL — SIGNIFICANT CHANGE UP (ref 0–0.9)
MONOCYTES NFR BLD AUTO: 3.2 % — SIGNIFICANT CHANGE UP (ref 2–14)
NEUTROPHILS # BLD AUTO: 13.72 K/UL — HIGH (ref 1.8–7.4)
NEUTROPHILS NFR BLD AUTO: 87.3 % — HIGH (ref 43–77)
NITRITE UR-MCNC: NEGATIVE — SIGNIFICANT CHANGE UP
PH UR: 8 — SIGNIFICANT CHANGE UP (ref 5–8)
PLATELET # BLD AUTO: 350 K/UL — SIGNIFICANT CHANGE UP (ref 150–400)
POTASSIUM SERPL-MCNC: 4 MMOL/L — SIGNIFICANT CHANGE UP (ref 3.5–5.3)
POTASSIUM SERPL-SCNC: 4 MMOL/L — SIGNIFICANT CHANGE UP (ref 3.5–5.3)
PROT SERPL-MCNC: 8 GM/DL — SIGNIFICANT CHANGE UP (ref 6–8.3)
PROT UR-MCNC: 30 MG/DL
RAPID RVP RESULT: SIGNIFICANT CHANGE UP
RBC # BLD: 4.68 M/UL — SIGNIFICANT CHANGE UP (ref 3.8–5.2)
RBC # FLD: 13.4 % — SIGNIFICANT CHANGE UP (ref 10.3–14.5)
SARS-COV-2 RNA SPEC QL NAA+PROBE: SIGNIFICANT CHANGE UP
SODIUM SERPL-SCNC: 136 MMOL/L — SIGNIFICANT CHANGE UP (ref 135–145)
SP GR SPEC: 1.01 — SIGNIFICANT CHANGE UP (ref 1.01–1.02)
TROPONIN I, HIGH SENSITIVITY RESULT: <3 NG/L — SIGNIFICANT CHANGE UP
UROBILINOGEN FLD QL: NEGATIVE — SIGNIFICANT CHANGE UP
WBC # BLD: 15.73 K/UL — HIGH (ref 3.8–10.5)
WBC # FLD AUTO: 15.73 K/UL — HIGH (ref 3.8–10.5)

## 2023-05-30 PROCEDURE — 96375 TX/PRO/DX INJ NEW DRUG ADDON: CPT

## 2023-05-30 PROCEDURE — 87086 URINE CULTURE/COLONY COUNT: CPT

## 2023-05-30 PROCEDURE — 84484 ASSAY OF TROPONIN QUANT: CPT

## 2023-05-30 PROCEDURE — 99285 EMERGENCY DEPT VISIT HI MDM: CPT | Mod: 25

## 2023-05-30 PROCEDURE — 85025 COMPLETE CBC W/AUTO DIFF WBC: CPT

## 2023-05-30 PROCEDURE — 93005 ELECTROCARDIOGRAM TRACING: CPT

## 2023-05-30 PROCEDURE — 76705 ECHO EXAM OF ABDOMEN: CPT | Mod: 26

## 2023-05-30 PROCEDURE — 93010 ELECTROCARDIOGRAM REPORT: CPT

## 2023-05-30 PROCEDURE — 99284 EMERGENCY DEPT VISIT MOD MDM: CPT

## 2023-05-30 PROCEDURE — 71046 X-RAY EXAM CHEST 2 VIEWS: CPT | Mod: 26

## 2023-05-30 PROCEDURE — 81001 URINALYSIS AUTO W/SCOPE: CPT

## 2023-05-30 PROCEDURE — 0225U NFCT DS DNA&RNA 21 SARSCOV2: CPT

## 2023-05-30 PROCEDURE — 84702 CHORIONIC GONADOTROPIN TEST: CPT

## 2023-05-30 PROCEDURE — 96374 THER/PROPH/DIAG INJ IV PUSH: CPT

## 2023-05-30 PROCEDURE — 71046 X-RAY EXAM CHEST 2 VIEWS: CPT

## 2023-05-30 PROCEDURE — 83690 ASSAY OF LIPASE: CPT

## 2023-05-30 PROCEDURE — 80053 COMPREHEN METABOLIC PANEL: CPT

## 2023-05-30 PROCEDURE — 76705 ECHO EXAM OF ABDOMEN: CPT

## 2023-05-30 PROCEDURE — 36415 COLL VENOUS BLD VENIPUNCTURE: CPT

## 2023-05-30 RX ORDER — SODIUM CHLORIDE 9 MG/ML
1000 INJECTION INTRAMUSCULAR; INTRAVENOUS; SUBCUTANEOUS ONCE
Refills: 0 | Status: COMPLETED | OUTPATIENT
Start: 2023-05-30 | End: 2023-05-30

## 2023-05-30 RX ORDER — ACETAMINOPHEN 500 MG
1000 TABLET ORAL ONCE
Refills: 0 | Status: COMPLETED | OUTPATIENT
Start: 2023-05-30 | End: 2023-05-30

## 2023-05-30 RX ORDER — FAMOTIDINE 10 MG/ML
20 INJECTION INTRAVENOUS ONCE
Refills: 0 | Status: COMPLETED | OUTPATIENT
Start: 2023-05-30 | End: 2023-05-30

## 2023-05-30 RX ORDER — KETOROLAC TROMETHAMINE 30 MG/ML
15 SYRINGE (ML) INJECTION ONCE
Refills: 0 | Status: DISCONTINUED | OUTPATIENT
Start: 2023-05-30 | End: 2023-05-30

## 2023-05-30 RX ORDER — METOCLOPRAMIDE HCL 10 MG
10 TABLET ORAL ONCE
Refills: 0 | Status: COMPLETED | OUTPATIENT
Start: 2023-05-30 | End: 2023-05-30

## 2023-05-30 RX ADMIN — Medication 10 MILLIGRAM(S): at 18:51

## 2023-05-30 RX ADMIN — Medication 15 MILLIGRAM(S): at 15:38

## 2023-05-30 RX ADMIN — FAMOTIDINE 20 MILLIGRAM(S): 10 INJECTION INTRAVENOUS at 15:38

## 2023-05-30 RX ADMIN — Medication 400 MILLIGRAM(S): at 18:51

## 2023-05-30 RX ADMIN — SODIUM CHLORIDE 1000 MILLILITER(S): 9 INJECTION INTRAMUSCULAR; INTRAVENOUS; SUBCUTANEOUS at 15:37

## 2023-05-30 NOTE — ED PROVIDER NOTE - NS ED ROS FT
Constitutional: +fever, chills.  Eyes:  No visual changes  ENMT:  No neck pain  Cardiac:  +chest pain  Respiratory:  No cough, SOB  GI:  +nausea, vomiting,  :  +dysuria  MS:  No back pain.  Neuro:  +headache

## 2023-05-30 NOTE — ED PROVIDER NOTE - CLINICAL SUMMARY MEDICAL DECISION MAKING FREE TEXT BOX
38y F p/w multiple medical complaints  mildly febrile, tachycardic likely 2/2 to fever, not ill appearing, satting well on RA  abd soft epigastric ttp, negative murphys, no rlq ttp. lungs clear, no cva ttp. no meningeal signs, no nuchal rigidity  eval with labs, USGB, cxr, trop, ekg, viral swab, ua ux  likely not meningitis, appy, volvulus, perf  pending reassessment

## 2023-05-30 NOTE — ED PROVIDER NOTE - PHYSICAL EXAMINATION
Vital signs reviewed  GENERAL: Patient nontoxic appearing, NAD  HEAD: NCAT  EYES: Anicteric  ENT: MMM  NECK: Supple, non tender  RESPIRATORY: Normal respiratory effort. CTA B/L. No wheezing, rales, rhonchi  CARDIOVASCULAR: Regular rate and rhythm  ABDOMEN: Soft. Nondistended. Nontender. No guarding or rebound. No CVA tenderness.  MUSCULOSKELETAL/EXTREMITIES: Brisk cap refill. 2+ radial pulses. No leg edema.  SKIN:  Warm and dry  NEURO: AAOx3. No gross FND.  PSYCHIATRIC: Cooperative. Affect appropriate. Vital signs reviewed  GENERAL: Patient nontoxic appearing, NAD  HEAD: NCAT  EYES: Anicteric  ENT: MMM  NECK: Supple, non tender  RESPIRATORY: Normal respiratory effort. CTA B/L. No wheezing, rales, rhonchi  CARDIOVASCULAR: Regular rate and rhythm  ABDOMEN: Soft. Nondistended. epigastric ttp, no gaurding or rebound   MUSCULOSKELETAL/EXTREMITIES: Brisk cap refill. 2+ radial pulses. No leg edema.  SKIN:  Warm and dry  NEURO: AAOx3. No gross FND.  PSYCHIATRIC: Cooperative. Affect appropriate.

## 2023-05-30 NOTE — ED ADULT TRIAGE NOTE - CHIEF COMPLAINT QUOTE
patient ambulatory to ED c/o headache, body aches, sore throat,  fever since yesterday.  last tylenol at 10 AM.

## 2023-05-30 NOTE — ED PROVIDER NOTE - PATIENT PORTAL LINK FT
You can access the FollowMyHealth Patient Portal offered by [facility] by registering at the following website: [email or web address]. By joining [facility]’s FollowMyHealth portal, you will also be able to view your health information using other applications (apps) compatible with our system.

## 2023-05-30 NOTE — ED ADULT NURSE NOTE - OBJECTIVE STATEMENT
Pt arrives to ED complaining of headache, fever, cough for two days. Denies medical history. alert and oriented x 4. ambulatory.

## 2023-05-30 NOTE — ED PROVIDER NOTE - PROGRESS NOTE DETAILS
Anaya English PGY-3   patient well appearing, stable for discharge, vitals reviewed, given strict return precautions for worsening abd pain, cp, sob, vomiting or any new concerning sxs to come back to ER Plan to follow up with PMD

## 2023-05-30 NOTE — ED PROVIDER NOTE - OBJECTIVE STATEMENT
38y F [id number]   presents to ER for sore throat, b/l ear pain L>R, fever tmax 100.5, headache 4/10, bitemporal  had 1 episode of nbnb vomiting.   pt denies any dizziness, sob, palpitations, cough, numbness, hematuria   no recent travel or ill contacts   also endorsing dysuria x 2 days, no hematuria

## 2023-05-30 NOTE — ED ADULT NURSE NOTE - NSFALLUNIVINTERV_ED_ALL_ED
Bed/Stretcher in lowest position, wheels locked, appropriate side rails in place/Call bell, personal items and telephone in reach/Instruct patient to call for assistance before getting out of bed/chair/stretcher/Non-slip footwear applied when patient is off stretcher/[place] to call system/Physically safe environment - no spills, clutter or unnecessary equipment/Purposeful proactive rounding/Room/bathroom lighting operational, light cord in reach

## 2023-05-30 NOTE — ED PROVIDER NOTE - ATTENDING CONTRIBUTION TO CARE
I, Hitesh Cervantes MD, personally saw the patient with resident.  I have personally performed a face to face diagnostic evaluation on this patient.  I have reviewed the resident note and agree with the history, exam, and plan of care, except as noted.     General: AAOx3, NAD  HEENT: NCAT  Cardiac: Normal rate and rhythym, no murmurs, normal peripheral perfusion  Respiratory: Normal rate and effort. CTAB  GI: Soft, nondistended, nontender  Neuro: No focal deficits. HODGSON equally x4, sensation to light touch intact throughout  MSK: FROMx4, no focal bony tenderness, no peripheral edema  Skin: No rash

## 2023-05-30 NOTE — ED ADULT TRIAGE NOTE - WEIGHT IN LBS
139.9 Crescentic Advancement Flap Text: Given the location of the defect and the proximity to free margins a crescentic advancement flap was deemed most appropriate.  Using a sterile surgical marker, the appropriate advancement flap was drawn incorporating the defect and placing the expected incisions within the relaxed skin tension lines where possible.    The area thus outlined was incised deep to adipose tissue with a #15c scalpel blade.  The skin margins were undermined to an appropriate distance in all directions utilizing iris scissors.

## 2023-05-31 LAB
CULTURE RESULTS: SIGNIFICANT CHANGE UP
SPECIMEN SOURCE: SIGNIFICANT CHANGE UP

## 2023-07-31 NOTE — ED ADULT NURSE NOTE - CAS EDP DISCH TYPE
[facility] - Endoscopy  Discharge Note  Short Stay    Procedure(s) (LRB):  COLONOSCOPY, WITH POLYPECTOMY USING SNARE (N/A)  COLONOSCOPY, WITH POLYPECTOMY USING cold BIOPSY FORCEPS  After consent was obtained the patient was transferred to the endoscopy suite.  General IV anesthesia was provided by anesthesia Services.  The Olympus videocolonoscope was introduced per rectum and advanced around the colon to the cecum.  The ileocecal valve and appendiceal orifice were identified and normal.  The distal terminal ileum inspected and noted to be normal.  The ascending colon was normal.  In the proximal transverse colon a 3 mm sessile polyp was identified and snared but apparently was never retrieved.  The remainder of the transverse colon was normal.  In the descending colon a diminutive polyp was identified and with cold biopsy forceps.  There was scattered diverticula in the sigmoid colon with no evidence of diverticulitis.  There was a diminutive polyp present in the mid rectum removed with cold biopsy forceps.  The endoscope was withdrawn.  Withdrawal time cecum to rectum 0.4 minutes.      Discharge plan-the patient will resume her normal diet today and normal activities tomorrow.  If the polyps removed today are adenomas a follow-up colonoscopy in 5 years is recommended.  OUTCOME: Patient tolerated treatment/procedure well without complication and is now ready for discharge.    DISPOSITION: Home or Self Care    FINAL DIAGNOSIS:  <principal problem not specified>    FOLLOWUP: With primary care provider    DISCHARGE INSTRUCTIONS:    Discharge Procedure Orders   Diet Adult Regular     Diet general     Notify your health care provider if you experience any of the following:  persistent nausea and vomiting or diarrhea     Notify your health care provider if you experience any of the following:  severe uncontrolled pain     Call MD for:  temperature >100.4     Call MD for:  persistent nausea and vomiting     Call  MD for:  severe uncontrolled pain     Call MD for:  difficulty breathing, headache or visual disturbances     Activity as tolerated   Order Comments: No driving today     Activity as tolerated         Clinical Reference Documents Added to Patient Instructions         Document    COLONOSCOPY DISCHARGE INSTRUCTIONS (ENGLISH)            TIME SPENT ON DISCHARGE: 10 minutes   Home

## 2024-07-12 ENCOUNTER — EMERGENCY (EMERGENCY)
Facility: HOSPITAL | Age: 39
LOS: 0 days | Discharge: HOME | End: 2024-07-12
Attending: EMERGENCY MEDICINE
Payer: COMMERCIAL

## 2024-07-12 VITALS
RESPIRATION RATE: 18 BRPM | HEART RATE: 96 BPM | DIASTOLIC BLOOD PRESSURE: 82 MMHG | SYSTOLIC BLOOD PRESSURE: 121 MMHG | TEMPERATURE: 100 F | OXYGEN SATURATION: 100 %

## 2024-07-12 VITALS
DIASTOLIC BLOOD PRESSURE: 76 MMHG | TEMPERATURE: 98 F | HEART RATE: 75 BPM | RESPIRATION RATE: 16 BRPM | SYSTOLIC BLOOD PRESSURE: 120 MMHG | OXYGEN SATURATION: 100 %

## 2024-07-12 DIAGNOSIS — J02.9 ACUTE PHARYNGITIS, UNSPECIFIED: ICD-10-CM

## 2024-07-12 DIAGNOSIS — Z98.891 HISTORY OF UTERINE SCAR FROM PREVIOUS SURGERY: Chronic | ICD-10-CM

## 2024-07-12 DIAGNOSIS — R51.9 HEADACHE, UNSPECIFIED: ICD-10-CM

## 2024-07-12 DIAGNOSIS — R50.9 FEVER, UNSPECIFIED: ICD-10-CM

## 2024-07-12 DIAGNOSIS — Z20.822 CONTACT WITH AND (SUSPECTED) EXPOSURE TO COVID-19: ICD-10-CM

## 2024-07-12 DIAGNOSIS — B34.9 VIRAL INFECTION, UNSPECIFIED: ICD-10-CM

## 2024-07-12 LAB
ALBUMIN SERPL ELPH-MCNC: 3.3 G/DL — SIGNIFICANT CHANGE UP (ref 3.3–5)
ALP SERPL-CCNC: 84 U/L — SIGNIFICANT CHANGE UP (ref 40–120)
ALT FLD-CCNC: 23 U/L — SIGNIFICANT CHANGE UP (ref 12–78)
ANION GAP SERPL CALC-SCNC: 5 MMOL/L — SIGNIFICANT CHANGE UP (ref 5–17)
APPEARANCE UR: ABNORMAL
AST SERPL-CCNC: 20 U/L — SIGNIFICANT CHANGE UP (ref 15–37)
BACTERIA # UR AUTO: ABNORMAL /HPF
BASOPHILS # BLD AUTO: 0.05 K/UL — SIGNIFICANT CHANGE UP (ref 0–0.2)
BASOPHILS NFR BLD AUTO: 0.5 % — SIGNIFICANT CHANGE UP (ref 0–2)
BILIRUB SERPL-MCNC: 0.7 MG/DL — SIGNIFICANT CHANGE UP (ref 0.2–1.2)
BILIRUB UR-MCNC: NEGATIVE — SIGNIFICANT CHANGE UP
BUN SERPL-MCNC: 11 MG/DL — SIGNIFICANT CHANGE UP (ref 7–23)
CALCIUM SERPL-MCNC: 8.6 MG/DL — SIGNIFICANT CHANGE UP (ref 8.5–10.1)
CAST: 0 /LPF — SIGNIFICANT CHANGE UP (ref 0–4)
CHLORIDE SERPL-SCNC: 107 MMOL/L — SIGNIFICANT CHANGE UP (ref 96–108)
CO2 SERPL-SCNC: 27 MMOL/L — SIGNIFICANT CHANGE UP (ref 22–31)
COLOR SPEC: YELLOW — SIGNIFICANT CHANGE UP
CREAT SERPL-MCNC: 0.46 MG/DL — LOW (ref 0.5–1.3)
DIFF PNL FLD: ABNORMAL
EGFR: 124 ML/MIN/1.73M2 — SIGNIFICANT CHANGE UP
EOSINOPHIL # BLD AUTO: 0.34 K/UL — SIGNIFICANT CHANGE UP (ref 0–0.5)
EOSINOPHIL NFR BLD AUTO: 3.5 % — SIGNIFICANT CHANGE UP (ref 0–6)
FLUAV AG NPH QL: SIGNIFICANT CHANGE UP
FLUBV AG NPH QL: SIGNIFICANT CHANGE UP
GLUCOSE SERPL-MCNC: 118 MG/DL — HIGH (ref 70–99)
GLUCOSE UR QL: NEGATIVE MG/DL — SIGNIFICANT CHANGE UP
HCT VFR BLD CALC: 36.4 % — SIGNIFICANT CHANGE UP (ref 34.5–45)
HGB BLD-MCNC: 11.9 G/DL — SIGNIFICANT CHANGE UP (ref 11.5–15.5)
IMM GRANULOCYTES NFR BLD AUTO: 0.9 % — SIGNIFICANT CHANGE UP (ref 0–0.9)
KETONES UR-MCNC: NEGATIVE MG/DL — SIGNIFICANT CHANGE UP
LEUKOCYTE ESTERASE UR-ACNC: ABNORMAL
LYMPHOCYTES # BLD AUTO: 1.6 K/UL — SIGNIFICANT CHANGE UP (ref 1–3.3)
LYMPHOCYTES # BLD AUTO: 16.2 % — SIGNIFICANT CHANGE UP (ref 13–44)
MCHC RBC-ENTMCNC: 27.3 PG — SIGNIFICANT CHANGE UP (ref 27–34)
MCHC RBC-ENTMCNC: 32.7 GM/DL — SIGNIFICANT CHANGE UP (ref 32–36)
MCV RBC AUTO: 83.5 FL — SIGNIFICANT CHANGE UP (ref 80–100)
MONOCYTES # BLD AUTO: 0.63 K/UL — SIGNIFICANT CHANGE UP (ref 0–0.9)
MONOCYTES NFR BLD AUTO: 6.4 % — SIGNIFICANT CHANGE UP (ref 2–14)
NEUTROPHILS # BLD AUTO: 7.14 K/UL — SIGNIFICANT CHANGE UP (ref 1.8–7.4)
NEUTROPHILS NFR BLD AUTO: 72.5 % — SIGNIFICANT CHANGE UP (ref 43–77)
NITRITE UR-MCNC: NEGATIVE — SIGNIFICANT CHANGE UP
PH UR: 7 — SIGNIFICANT CHANGE UP (ref 5–8)
PLATELET # BLD AUTO: 299 K/UL — SIGNIFICANT CHANGE UP (ref 150–400)
POCT URINE PREGNANCY TEST: NEGATIVE — SIGNIFICANT CHANGE UP
POTASSIUM SERPL-MCNC: 3.6 MMOL/L — SIGNIFICANT CHANGE UP (ref 3.5–5.3)
POTASSIUM SERPL-SCNC: 3.6 MMOL/L — SIGNIFICANT CHANGE UP (ref 3.5–5.3)
PROT SERPL-MCNC: 7.6 GM/DL — SIGNIFICANT CHANGE UP (ref 6–8.3)
PROT UR-MCNC: 100 MG/DL
RBC # BLD: 4.36 M/UL — SIGNIFICANT CHANGE UP (ref 3.8–5.2)
RBC # FLD: 13.3 % — SIGNIFICANT CHANGE UP (ref 10.3–14.5)
RBC CASTS # UR COMP ASSIST: 71 /HPF — HIGH (ref 0–4)
RSV RNA NPH QL NAA+NON-PROBE: SIGNIFICANT CHANGE UP
S PYO AG SPEC QL IA: NEGATIVE — SIGNIFICANT CHANGE UP
SARS-COV-2 RNA SPEC QL NAA+PROBE: SIGNIFICANT CHANGE UP
SODIUM SERPL-SCNC: 139 MMOL/L — SIGNIFICANT CHANGE UP (ref 135–145)
SP GR SPEC: 1.02 — SIGNIFICANT CHANGE UP (ref 1–1.03)
SQUAMOUS # UR AUTO: 9 /HPF — HIGH (ref 0–5)
UROBILINOGEN FLD QL: 2 MG/DL (ref 0.2–1)
WBC # BLD: 9.85 K/UL — SIGNIFICANT CHANGE UP (ref 3.8–10.5)
WBC # FLD AUTO: 9.85 K/UL — SIGNIFICANT CHANGE UP (ref 3.8–10.5)
WBC UR QL: 2 /HPF — SIGNIFICANT CHANGE UP (ref 0–5)

## 2024-07-12 PROCEDURE — 85025 COMPLETE CBC W/AUTO DIFF WBC: CPT

## 2024-07-12 PROCEDURE — 87880 STREP A ASSAY W/OPTIC: CPT

## 2024-07-12 PROCEDURE — 71046 X-RAY EXAM CHEST 2 VIEWS: CPT

## 2024-07-12 PROCEDURE — 36415 COLL VENOUS BLD VENIPUNCTURE: CPT

## 2024-07-12 PROCEDURE — 87081 CULTURE SCREEN ONLY: CPT

## 2024-07-12 PROCEDURE — 99284 EMERGENCY DEPT VISIT MOD MDM: CPT

## 2024-07-12 PROCEDURE — 80053 COMPREHEN METABOLIC PANEL: CPT

## 2024-07-12 PROCEDURE — 81025 URINE PREGNANCY TEST: CPT

## 2024-07-12 PROCEDURE — 99285 EMERGENCY DEPT VISIT HI MDM: CPT | Mod: 25

## 2024-07-12 PROCEDURE — 96374 THER/PROPH/DIAG INJ IV PUSH: CPT

## 2024-07-12 PROCEDURE — 93010 ELECTROCARDIOGRAM REPORT: CPT

## 2024-07-12 PROCEDURE — 81001 URINALYSIS AUTO W/SCOPE: CPT

## 2024-07-12 PROCEDURE — 0241U: CPT

## 2024-07-12 PROCEDURE — 93005 ELECTROCARDIOGRAM TRACING: CPT

## 2024-07-12 PROCEDURE — 71046 X-RAY EXAM CHEST 2 VIEWS: CPT | Mod: 26

## 2024-07-12 RX ORDER — ACETAMINOPHEN 325 MG
1000 TABLET ORAL ONCE
Refills: 0 | Status: COMPLETED | OUTPATIENT
Start: 2024-07-12 | End: 2024-07-12

## 2024-07-12 RX ORDER — SODIUM CHLORIDE 0.9 % (FLUSH) 0.9 %
1000 SYRINGE (ML) INJECTION ONCE
Refills: 0 | Status: COMPLETED | OUTPATIENT
Start: 2024-07-12 | End: 2024-07-12

## 2024-07-12 RX ADMIN — Medication 400 MILLIGRAM(S): at 13:35

## 2024-07-12 RX ADMIN — Medication 2000 MILLILITER(S): at 13:34

## 2024-07-15 RX ORDER — AMOXICILLIN 500 MG
2 CAPSULE ORAL
Qty: 20 | Refills: 0
Start: 2024-07-15 | End: 2024-07-24

## 2024-09-10 ENCOUNTER — APPOINTMENT (OUTPATIENT)
Dept: ANTEPARTUM | Facility: CLINIC | Age: 39
End: 2024-09-10
Payer: COMMERCIAL

## 2024-09-10 ENCOUNTER — ASOB RESULT (OUTPATIENT)
Age: 39
End: 2024-09-10

## 2024-09-10 PROCEDURE — 76857 US EXAM PELVIC LIMITED: CPT | Mod: 59

## 2024-09-10 PROCEDURE — 76830 TRANSVAGINAL US NON-OB: CPT

## 2024-12-21 ENCOUNTER — EMERGENCY (EMERGENCY)
Facility: HOSPITAL | Age: 39
LOS: 0 days | Discharge: HOME | End: 2024-12-21
Attending: EMERGENCY MEDICINE
Payer: COMMERCIAL

## 2024-12-21 VITALS
RESPIRATION RATE: 18 BRPM | OXYGEN SATURATION: 100 % | WEIGHT: 193.12 LBS | DIASTOLIC BLOOD PRESSURE: 92 MMHG | TEMPERATURE: 98 F | SYSTOLIC BLOOD PRESSURE: 142 MMHG | HEART RATE: 103 BPM
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
## 2024-12-21 DIAGNOSIS — H10.13 ACUTE ATOPIC CONJUNCTIVITIS, BILATERAL: ICD-10-CM

## 2024-12-21 DIAGNOSIS — S05.12XA CONTUSION OF EYEBALL AND ORBITAL TISSUES, LEFT EYE, INITIAL ENCOUNTER: ICD-10-CM

## 2024-12-21 DIAGNOSIS — Z98.891 HISTORY OF UTERINE SCAR FROM PREVIOUS SURGERY: Chronic | ICD-10-CM
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
## 2024-12-21 PROCEDURE — 99283 EMERGENCY DEPT VISIT LOW MDM: CPT

## 2024-12-21 RX ADMIN — Medication 1 DROP(S): at 23:37

## 2024-12-21 RX ADMIN — Medication 10 MILLIGRAM(S): at 23:37

## 2025-02-03 ENCOUNTER — APPOINTMENT (OUTPATIENT)
Dept: FAMILY MEDICINE | Facility: CLINIC | Age: 40
End: 2025-02-03

## 2025-02-21 ENCOUNTER — EMERGENCY (EMERGENCY)
Facility: HOSPITAL | Age: 40
LOS: 0 days | Discharge: HOME | End: 2025-02-22
Attending: EMERGENCY MEDICINE
Payer: COMMERCIAL

## 2025-02-21 VITALS
RESPIRATION RATE: 18 BRPM | HEART RATE: 72 BPM | TEMPERATURE: 99 F | SYSTOLIC BLOOD PRESSURE: 128 MMHG | WEIGHT: 193.35 LBS | DIASTOLIC BLOOD PRESSURE: 82 MMHG | OXYGEN SATURATION: 100 %

## 2025-02-21 DIAGNOSIS — Z98.891 HISTORY OF UTERINE SCAR FROM PREVIOUS SURGERY: Chronic | ICD-10-CM

## 2025-02-21 PROCEDURE — 93975 VASCULAR STUDY: CPT

## 2025-02-21 PROCEDURE — 81001 URINALYSIS AUTO W/SCOPE: CPT

## 2025-02-21 PROCEDURE — 36415 COLL VENOUS BLD VENIPUNCTURE: CPT

## 2025-02-21 PROCEDURE — 83690 ASSAY OF LIPASE: CPT

## 2025-02-21 PROCEDURE — 80053 COMPREHEN METABOLIC PANEL: CPT

## 2025-02-21 PROCEDURE — 80074 ACUTE HEPATITIS PANEL: CPT

## 2025-02-21 PROCEDURE — 76856 US EXAM PELVIC COMPLETE: CPT

## 2025-02-21 PROCEDURE — 84484 ASSAY OF TROPONIN QUANT: CPT

## 2025-02-21 PROCEDURE — 83605 ASSAY OF LACTIC ACID: CPT

## 2025-02-21 PROCEDURE — 82803 BLOOD GASES ANY COMBINATION: CPT

## 2025-02-21 PROCEDURE — 84702 CHORIONIC GONADOTROPIN TEST: CPT

## 2025-02-21 PROCEDURE — 76830 TRANSVAGINAL US NON-OB: CPT

## 2025-02-21 PROCEDURE — 96374 THER/PROPH/DIAG INJ IV PUSH: CPT

## 2025-02-21 PROCEDURE — 85379 FIBRIN DEGRADATION QUANT: CPT

## 2025-02-21 PROCEDURE — 93005 ELECTROCARDIOGRAM TRACING: CPT

## 2025-02-21 PROCEDURE — 85025 COMPLETE CBC W/AUTO DIFF WBC: CPT

## 2025-02-21 PROCEDURE — 99285 EMERGENCY DEPT VISIT HI MDM: CPT | Mod: 25

## 2025-02-21 PROCEDURE — 99285 EMERGENCY DEPT VISIT HI MDM: CPT

## 2025-02-21 PROCEDURE — 74177 CT ABD & PELVIS W/CONTRAST: CPT | Mod: MC

## 2025-02-21 PROCEDURE — 86703 HIV-1/HIV-2 1 RESULT ANTBDY: CPT

## 2025-02-21 PROCEDURE — 96375 TX/PRO/DX INJ NEW DRUG ADDON: CPT

## 2025-02-21 RX ORDER — BACTERIOSTATIC SODIUM CHLORIDE 0.9 %
1000 VIAL (ML) INJECTION ONCE
Refills: 0 | Status: COMPLETED | OUTPATIENT
Start: 2025-02-21 | End: 2025-02-21

## 2025-02-21 RX ORDER — ACETAMINOPHEN 160 MG/5ML
1000 SUSPENSION ORAL ONCE
Refills: 0 | Status: COMPLETED | OUTPATIENT
Start: 2025-02-21 | End: 2025-02-21

## 2025-02-21 NOTE — ED ADULT TRIAGE NOTE - CHIEF COMPLAINT QUOTE
pt ambulatory to ED for c/o suprapubic and flank pain. endorses urinary sx. denies fever/chills. no meds pta. a&ox4. NKDA

## 2025-02-22 VITALS
TEMPERATURE: 98 F | OXYGEN SATURATION: 100 % | RESPIRATION RATE: 16 BRPM | DIASTOLIC BLOOD PRESSURE: 64 MMHG | HEART RATE: 80 BPM | SYSTOLIC BLOOD PRESSURE: 115 MMHG

## 2025-02-22 LAB
ADD ON TEST-SPECIMEN IN LAB: SIGNIFICANT CHANGE UP
ALBUMIN SERPL ELPH-MCNC: 3.4 G/DL — SIGNIFICANT CHANGE UP (ref 3.3–5)
ALP SERPL-CCNC: 87 U/L — SIGNIFICANT CHANGE UP (ref 40–120)
ALT FLD-CCNC: 22 U/L — SIGNIFICANT CHANGE UP (ref 12–78)
ANION GAP SERPL CALC-SCNC: 3 MMOL/L — LOW (ref 5–17)
APPEARANCE UR: CLEAR — SIGNIFICANT CHANGE UP
AST SERPL-CCNC: 14 U/L — LOW (ref 15–37)
BACTERIA # UR AUTO: ABNORMAL /HPF
BASE EXCESS BLDV CALC-SCNC: 0.8 MMOL/L — SIGNIFICANT CHANGE UP (ref -2–3)
BASOPHILS # BLD AUTO: 0.05 K/UL — SIGNIFICANT CHANGE UP (ref 0–0.2)
BASOPHILS NFR BLD AUTO: 0.5 % — SIGNIFICANT CHANGE UP (ref 0–2)
BILIRUB SERPL-MCNC: 0.4 MG/DL — SIGNIFICANT CHANGE UP (ref 0.2–1.2)
BILIRUB UR-MCNC: NEGATIVE — SIGNIFICANT CHANGE UP
BUN SERPL-MCNC: 12 MG/DL — SIGNIFICANT CHANGE UP (ref 7–23)
CALCIUM SERPL-MCNC: 8.7 MG/DL — SIGNIFICANT CHANGE UP (ref 8.5–10.1)
CAST: 0 /LPF — SIGNIFICANT CHANGE UP (ref 0–4)
CHLORIDE SERPL-SCNC: 108 MMOL/L — SIGNIFICANT CHANGE UP (ref 96–108)
CO2 SERPL-SCNC: 26 MMOL/L — SIGNIFICANT CHANGE UP (ref 22–31)
COLOR SPEC: YELLOW — SIGNIFICANT CHANGE UP
CREAT SERPL-MCNC: 0.42 MG/DL — LOW (ref 0.5–1.3)
D DIMER BLD IA.RAPID-MCNC: <150 NG/ML DDU — SIGNIFICANT CHANGE UP
DIFF PNL FLD: ABNORMAL
EGFR: 126 ML/MIN/1.73M2 — SIGNIFICANT CHANGE UP
EOSINOPHIL # BLD AUTO: 0.31 K/UL — SIGNIFICANT CHANGE UP (ref 0–0.5)
EOSINOPHIL NFR BLD AUTO: 3 % — SIGNIFICANT CHANGE UP (ref 0–6)
GAS PNL BLDV: SIGNIFICANT CHANGE UP
GLUCOSE SERPL-MCNC: 111 MG/DL — HIGH (ref 70–99)
GLUCOSE UR QL: NEGATIVE MG/DL — SIGNIFICANT CHANGE UP
HCG SERPL-ACNC: <1 MIU/ML — SIGNIFICANT CHANGE UP
HCO3 BLDV-SCNC: 25 MMOL/L — SIGNIFICANT CHANGE UP (ref 22–29)
HCT VFR BLD CALC: 36.4 % — SIGNIFICANT CHANGE UP (ref 34.5–45)
HGB BLD-MCNC: 11.7 G/DL — SIGNIFICANT CHANGE UP (ref 11.5–15.5)
HIV 1 & 2 AB SERPL IA.RAPID: SIGNIFICANT CHANGE UP
IMM GRANULOCYTES # BLD AUTO: 0.11 K/UL — HIGH (ref 0–0.07)
IMM GRANULOCYTES NFR BLD AUTO: 1 % — HIGH (ref 0–0.9)
KETONES UR-MCNC: NEGATIVE MG/DL — SIGNIFICANT CHANGE UP
LACTATE SERPL-SCNC: 0.6 MMOL/L — LOW (ref 0.7–2)
LEUKOCYTE ESTERASE UR-ACNC: NEGATIVE — SIGNIFICANT CHANGE UP
LIDOCAIN IGE QN: 41 U/L — SIGNIFICANT CHANGE UP (ref 13–75)
LYMPHOCYTES # BLD AUTO: 3.82 K/UL — HIGH (ref 1–3.3)
LYMPHOCYTES NFR BLD AUTO: 36.5 % — SIGNIFICANT CHANGE UP (ref 13–44)
MCHC RBC-ENTMCNC: 27.7 PG — SIGNIFICANT CHANGE UP (ref 27–34)
MCHC RBC-ENTMCNC: 32.1 G/DL — SIGNIFICANT CHANGE UP (ref 32–36)
MCV RBC AUTO: 86.3 FL — SIGNIFICANT CHANGE UP (ref 80–100)
MONOCYTES # BLD AUTO: 0.74 K/UL — SIGNIFICANT CHANGE UP (ref 0–0.9)
MONOCYTES NFR BLD AUTO: 7.1 % — SIGNIFICANT CHANGE UP (ref 2–14)
NEUTROPHILS # BLD AUTO: 5.45 K/UL — SIGNIFICANT CHANGE UP (ref 1.8–7.4)
NEUTROPHILS NFR BLD AUTO: 51.9 % — SIGNIFICANT CHANGE UP (ref 43–77)
NITRITE UR-MCNC: NEGATIVE — SIGNIFICANT CHANGE UP
NRBC # BLD AUTO: 0 K/UL — SIGNIFICANT CHANGE UP (ref 0–0)
NRBC # FLD: 0 K/UL — SIGNIFICANT CHANGE UP (ref 0–0)
NRBC BLD AUTO-RTO: 0 /100 WBCS — SIGNIFICANT CHANGE UP (ref 0–0)
PCO2 BLDV: 39 MMHG — SIGNIFICANT CHANGE UP (ref 39–42)
PH BLDV: 7.42 — SIGNIFICANT CHANGE UP (ref 7.32–7.43)
PH UR: 8 — SIGNIFICANT CHANGE UP (ref 5–8)
PLATELET # BLD AUTO: 342 K/UL — SIGNIFICANT CHANGE UP (ref 150–400)
PMV BLD: 9.6 FL — SIGNIFICANT CHANGE UP (ref 7–13)
PO2 BLDV: 212 MMHG — HIGH (ref 25–45)
POTASSIUM SERPL-MCNC: 4.3 MMOL/L — SIGNIFICANT CHANGE UP (ref 3.5–5.3)
POTASSIUM SERPL-SCNC: 4.3 MMOL/L — SIGNIFICANT CHANGE UP (ref 3.5–5.3)
PROT SERPL-MCNC: 7.1 GM/DL — SIGNIFICANT CHANGE UP (ref 6–8.3)
PROT UR-MCNC: SIGNIFICANT CHANGE UP MG/DL
RBC # BLD: 4.22 M/UL — SIGNIFICANT CHANGE UP (ref 3.8–5.2)
RBC # FLD: 12.6 % — SIGNIFICANT CHANGE UP (ref 10.3–14.5)
RBC CASTS # UR COMP ASSIST: 14 /HPF — HIGH (ref 0–4)
SAO2 % BLDV: 99 % — HIGH (ref 67–88)
SODIUM SERPL-SCNC: 137 MMOL/L — SIGNIFICANT CHANGE UP (ref 135–145)
SP GR SPEC: 1.01 — SIGNIFICANT CHANGE UP (ref 1–1.03)
SQUAMOUS # UR AUTO: 5 /HPF — SIGNIFICANT CHANGE UP (ref 0–5)
TROPONIN I, HIGH SENSITIVITY RESULT: <3 NG/L — SIGNIFICANT CHANGE UP
UROBILINOGEN FLD QL: 0.2 MG/DL — SIGNIFICANT CHANGE UP (ref 0.2–1)
WBC # BLD: 10.48 K/UL — SIGNIFICANT CHANGE UP (ref 3.8–10.5)
WBC # FLD AUTO: 10.48 K/UL — SIGNIFICANT CHANGE UP (ref 3.8–10.5)
WBC UR QL: 1 /HPF — SIGNIFICANT CHANGE UP (ref 0–5)

## 2025-02-22 PROCEDURE — 76856 US EXAM PELVIC COMPLETE: CPT | Mod: 26,59

## 2025-02-22 PROCEDURE — 74177 CT ABD & PELVIS W/CONTRAST: CPT | Mod: 26

## 2025-02-22 PROCEDURE — 93010 ELECTROCARDIOGRAM REPORT: CPT

## 2025-02-22 PROCEDURE — 93975 VASCULAR STUDY: CPT | Mod: 26

## 2025-02-22 PROCEDURE — 76830 TRANSVAGINAL US NON-OB: CPT | Mod: 26

## 2025-02-22 RX ORDER — MORPHINE SULFATE 60 MG/1
2 TABLET, FILM COATED, EXTENDED RELEASE ORAL ONCE
Refills: 0 | Status: DISCONTINUED | OUTPATIENT
Start: 2025-02-22 | End: 2025-02-22

## 2025-02-22 RX ADMIN — Medication 80 MILLIGRAM(S): at 04:28

## 2025-02-22 RX ADMIN — Medication 1000 MILLILITER(S): at 00:18

## 2025-02-22 RX ADMIN — MORPHINE SULFATE 2 MILLIGRAM(S): 60 TABLET, FILM COATED, EXTENDED RELEASE ORAL at 01:52

## 2025-02-22 RX ADMIN — ACETAMINOPHEN 400 MILLIGRAM(S): 160 SUSPENSION ORAL at 00:19

## 2025-02-22 NOTE — ED PROVIDER NOTE - CARE PROVIDERS DIRECT ADDRESSES
[email or web address]

## 2025-02-22 NOTE — CONSULT NOTE ADULT - SUBJECTIVE AND OBJECTIVE BOX
Name: TRUMAN FLETCHER  MRN: [id number]    TRUMAN FLETCHER is a 40y  who presents to the ER for a 2-day history of left lower quadrant pain that radiates to her left lower back. She denies fevers, chills, or sweats. She denies nausea or vomiting. She reports normal bowel movements, last BM was this morning, normal in consistency for her. Denies diarrhea or constipation. She reports occasional mid epigastric pain after eating or laying flat. She denies dysuria or hematuria. Denies abnormal or foul-smelling vaginal discharge or vaginal bleeding.   In the ER, patient afebrile, VSS. Labs, U/A wnl. CT Abd/Pelvis and Pelvic US performed, which showed a tubular cystic structure in the right adnexa, suspected R hydrosalpinx. GYN consulted for hydrosalpinx vs. rule-out TOA.     OBHx: , prior c/s x4, last c/s in 2017 with bilateral salpingectomy at time of c/s  GynHx: -fibroids/-cysts; denies STI hx or abnormal papsmears  MedHx: deneis  SurgHx: c/s x4, bilateral salpingectomy   Meds: denies  All: NKDA    Vital Signs Last 24 Hrs  T(C): 36.7 (2025 01:48), Max: 37.1 (2025 23:38)  T(F): 98.1 (2025 01:48), Max: 98.8 (2025 23:38)  HR: 69 (2025 01:48) (69 - 72)  BP: 126/79 (2025 01:48) (126/79 - 128/82)  BP(mean): 92 (2025 01:48) (92 - 95)  RR: 18 (2025 01:48) (18 - 18)  SpO2: 100% (2025 01:48) (100% - 100%)    PHYSICAL EXAM:  Gen: NAD, AOx3  CV: S1S2, RRR  Pulm: CTABL  Abd: Soft, mildly tender to palpation in left lower quadrant. No rebound tenderness or guarding. Normoactive bowel sounds. Adnexa non-palpable.  Extrem: no pitting edema, erythema, or tenderness to palpation  Back: No CVA tenderness   Pelvic: deferred      LABS:                        11.7   10.48 )-----------( 342      ( 2025 00:15 )             36.4         137  |  108  |  12  ----------------------------<  111[H]  4.3   |  26  |  0.42[L]    Ca    8.7      2025 00:15    TPro  7.1  /  Alb  3.4  /  TBili  0.4  /  DBili  x   /  AST  14[L]  /  ALT  22  /  AlkPhos  87      Urinalysis Basic - ( 2025 00:56 )    Color: Yellow / Appearance: Clear / S.013 / pH: x  Gluc: x / Ketone: Negative mg/dL  / Bili: Negative / Urobili: 0.2 mg/dL   Blood: x / Protein: Trace mg/dL / Nitrite: Negative   Leuk Esterase: Negative / RBC: 14 /HPF / WBC 1 /HPF   Sq Epi: x / Non Sq Epi: 5 /HPF / Bacteria: Occasional /HPF    HCG Quantitative, Serum: <1 mIU/mL (25 @ 00:15)      RADIOLOGY STUDIES:  < from: US Pelvis Complete (25 @ 01:20) >  ACC: [id number] EXAM:  US PELVIC COMPLETE   ORDERED BY: AMBERLY PAIZ   ACC: [id number] EXAM:  US DPLX PELVIC   ORDERED BY: AMBERLY PAIZ   ACC: [id number] EXAM:  US TRANSVAGINAL   ORDERED BY: AMBERLY PAIZ   PROCEDURE DATE:  2025    INTERPRETATION:  CLINICAL INFORMATION: Flank pain, rule out ovarian   torsion.  LMP: 2025  COMPARISON: 2022 ultrasound  TECHNIQUE:  Endovaginal and transabdominal pelvic sonogram. Color and Spectral   Doppler was performed.  FINDINGS:  Uterus: 8.0 cm x 4.6 cm x 5.9 cm. 2.5 x 1.5 x 1.5 cm fundal myoma.  Endometrium: 8 mm. Within normal limits.  Right ovary: 4.2 cm x 2.8 cm x 4.5 cm. Within normal limits. Normal   arterial and venous waveforms. Anechoic tubular structure in the right   adnexa abutting the ovary.  Left ovary: 2.5 cm x 3.1 cm x 1.7 cm. Within normal limits. Normal   arterial and venous waveforms.  Fluid: None.  IMPRESSION:  No sonographic evidence of ovarian torsion.  Suspected right hydrosalpinx.  --- End of Report ---    RADHA POLANCO MD; Attending Radiologist  This document has been electronically signed. 2025  2:22AM    < end of copied text >    < from: CT Abdomen and Pelvis w/ IV Cont (25 @ 00:46) >  ACC: [id number] EXAM:  CT ABDOMEN AND PELVIS IC   ORDERED BY: AMBERLY MARTÍNEZI   PROCEDURE DATE:  2025   INTERPRETATION:  CLINICAL INFORMATION: Flank pain and dysuria, rule out   pyelonephritis.  COMPARISON: Same day pelvic ultrasound, 2021 CT  CONTRAST/COMPLICATIONS:  IV Contrast: Omnipaque 350  90 cc administered   0 cc discarded  Oral Contrast: NONE  PROCEDURE:  CT of the Abdomen and Pelvis was performed.  Sagittal and coronal reformats were performed.  FINDINGS:  LOWER CHEST: 3 mm subpleural right lower lobe nodule (2, 1).  LIVER: Within normal limits.  BILE DUCTS: Normal caliber.  GALLBLADDER: Within normal limits.  SPLEEN: Within normal limits.  PANCREAS: Within normal limits.  ADRENALS: Within normal limits.  KIDNEYS/URETERS: No hydronephrosis. Symmetric and homogeneous renal   enhancement.  BLADDER: Limited evaluation due to underdistention.  REPRODUCTIVE ORGANS: Fundal myoma. Tubular cystic structure in the right   adnexa seen on same day CT possibly reflecting hydrosalpinx, although,   small bowel loops are noted in this area and fluid-filled small bowel is   also possible.  BOWEL: No bowel obstruction. Appendix is normal. Colonic diverticulosis.   Small hiatal hernia.  PERITONEUM/RETROPERITONEUM: Within normal limits.  VESSELS: Within normal limits.  LYMPH NODES: No lymphadenopathy.  ABDOMINAL WALL: Small fat-containing umbilical hernia.  BONES: Degenerative changes.  IMPRESSION:  No CT evidence of pyelonephritis.  Tubular cysticstructure in the right adnexa seen on same day CT possibly   reflecting hydrosalpinx, although, fluid-filled small bowel loops are   noted in this area and fluid-filled small bowel is also possible.    --- End of Report ---  RADHA POLANCO MD; Attending Radiologist  This document has been electronically signed. 2025  2:39AM    < end of copied text >

## 2025-02-22 NOTE — CONSULT NOTE ADULT - ATTENDING COMMENTS
41 yo c/o LLQ pain x2 days with incidental finding of right hydrosalpinx was suspected to have TOA in ER.  GYN consultation requested.  VSS AF  Abd:  soft, NT  Pelvic exam benign  no leukocytosis  A/P:  LLQ pain unlikely due to right hydrosalpinx- hydrosalpinx likely secondary to h/o tubal ligation sequela.  Very unlikely to be TOA.

## 2025-02-22 NOTE — ED ADULT NURSE REASSESSMENT NOTE - NS ED NURSE REASSESS COMMENT FT1
Pt states she is still endorsing lower abdominal discomfort. MD Wills made aware and states she will input medication. VS as charted. Safety and comfort measures maintained.

## 2025-02-22 NOTE — CONSULT NOTE ADULT - ASSESSMENT
39 yo  who presents to the ER for a 2-day history of left lower quadrant pain that radiates to her left lower back. On imaging patient found to have suspected R hydrosalpinx. GYN consulted for rule-out TOA  -Afebrile, VSS  -Hemodynamically stable, Hgb 11.7  -No leukocytosis, WBC 10.48  -Non-peritonitic on abdominal exam  -TOA unlikely given no leukocytosis, no subjective or objective hx fevers  -Imaging: right hydrosalpinx, likely 2/2 given history of prior bilateral salpingectomy in 2017 at time of last c/s  -Patient's pain is predominantly in the left lower quadrant that radiates to her left back. Pain unlikely to be 2/2 R hydrosalpinx  -Tylenol, morphine helped improve her pain from a 9 to a 5/10. Consider giving simethicone for possible gas pain    Dispo: LLQ pain unlikely due to R hydrosalpinx found on imaging. TOA unlikely. Consider GI source such as gas pain, IBS. GYN signing off. Further management per primary ER team.    D/w Dr. KATIE Palafox MD1

## 2025-02-22 NOTE — ED PROVIDER NOTE - PATIENT PORTAL LINK FT
You can access the FollowMyHealth Patient Portal offered by [facility] by registering at the following website: [email or web address]. By joining [employer]’s FollowMyHealth portal, you will also be able to view your health information using other applications (apps) compatible with our system.

## 2025-02-22 NOTE — ED PROVIDER NOTE - PROGRESS NOTE DETAILS
Elma BAEZ: Pt is nontoxic appearing, VSS, tolerating PO, CT shows no bowel obstruction or acute emergent findings. US shows possible hydrosalpinx. Consulted ob gyn, ob saw patient, no concern for TOA, recommend simethicone and dc. Follow up with GI and gynecology provided for pt. Pt instructed to return if any worsening of the symptoms or if any other health concerns. Pt and daughter agreeable. All information discussed with pt and daughter via  and also via daughter who is fluent in English.

## 2025-02-22 NOTE — ED PROVIDER NOTE - CARE PROVIDER_API CALL
Astrid Celeste  Obstetrics and Gynecology  [address] [place] [address]  Phone: [phone]  Fax: [phone]  Follow Up Time:     Killian Solano  Gastroenterology  [address] [place] [address]  Phone: [phone]  Fax: [phone]  Follow Up Time:

## 2025-02-22 NOTE — ED ADULT NURSE NOTE - NSFALLUNIVINTERV_ED_ALL_ED
Bed/Stretcher in lowest position, wheels locked, appropriate side rails in place/Call bell, personal items and telephone in reach/Instruct patient to call for assistance before getting out of bed/chair/stretcher/Non-slip footwear applied when patient is off stretcher/[place] to call system/Physically safe environment - no spills, clutter or unnecessary equipment/Purposeful proactive rounding/Room/bathroom lighting operational, light cord in reach

## 2025-02-22 NOTE — ED ADULT NURSE NOTE - OBJECTIVE STATEMENT
Pt is a 41 y/o female, alert and oriented x3, presenting to ED c/o flank pain. Pt accompanied with daughter. Pt reports, "For the past 2 days, I've had lower stomach pain and back pain. I didn't take any medications for the pain. I feel like my stomach looks bloated." Pt reports urinary urgency. Pt denies hematuria, fevers, diarrhea, nausea, vomiting, chest pain, SOB. Pt reports last menstrual cycle was 2/6/2025.   20G IV placed on RIGHT AC and labs sent. EKG obtained.

## 2025-02-22 NOTE — ED PROVIDER NOTE - NSFOLLOWUPINSTRUCTIONS_ED_ALL_ED_FT
Please note that I have provided you with the results of the labs and imaging including all reports and the incidental findings in the CAT scan imaging. Please provided these reports to your primary care provider as further evaluation might be needed. You were seen by the obstetrics and gynecology team in the ER, and they state that you are safe to go home, and that no acute intervention needed. If you develop fever, worsening pain, or if any discharge vaginally return to us immediately. Please return to us if you have any chest pain, palpitation, shortness of breath or if any other health concerns. Please continue to hydrate, and eat normally.     While CAT scan or CT scan imaging is an ideal imaging in the emergent setting, at times it is not the most sensitive in diagnosis of all disease process. Please note that you have been provided written copies of the imaging and that you can access real time reports using the patient portal link. You can also have your primary care provider or your specialist, pull up the real time reports or review images. All CT type images are read by our radiology specialist and ER defers to their expertise on findings. As stated there are certain pathologies that can be missed even with the best attention to detail, due to the inherent limitation of the CAT scan imaging such as ulcers, small masses or tumors or even certain cancers. Also note that most if not all CAT scan imaging can find incidental findings. Incidental findings are reported findings that are not the main goal behind doing the CAT scan but are details that the radiologist sees that he feels the patient should know about. Generally most incidental findings are benign but NOT always. So please make sure you report all incidental findings to your primary care provider and your specialist, as they might need further imaging or testing to rule out certain conditions. You can potentially need other imaging such as MRI (magnetic resonance imaging), gastric endoscopy, colonoscopy, etc. Sometimes nothing needs to be done, but this is a decision to be made by the primary or specialist so please inform them of the findings.     Tenga en cuenta que le he proporcionado los resultados de los laboratorios y las imágenes, incluidos todos los informes y los hallazgos incidentales en las imágenes de exploración por TAC. Proporcione estos informes a hart proveedor de atención primaria, ya que podría ser necesaria elizabeth evaluación adicional. El equipo de obstetricia y ginecología la atendió en la abdulaziz de emergencias y le dijeron que puede regresar a casa con seguridad y que no se necesita ninguna intervención aguda. Si presenta fiebre, empeora el dolor o si presenta alguna secreción vaginal, regrese a nosotros inmediatamente. Vuelva con nosotros si tiene algún dolor en el pecho, palpitaciones, dificultad para respirar o si tiene algún otro problema de raquel. Continúe hidratándose y coma normalmente. none

## 2025-02-22 NOTE — ED PROVIDER NOTE - OBJECTIVE STATEMENT
40 year old female with PMH of C section, tubal ligation, presents with daughter with cc of diffuse abdominal pain, and Rt flank pain. No fever or chills. No melena or hematochezia. No visual or focal neurological complaints. No recent trauma. No recent tick bites. No cp, sob or palpitation. No melena or hematochezia. No dysuria hematuria or frequency. No weakness in arms or legs. No vomiting. No diarrhea.

## 2025-02-23 LAB
HAV IGM SER-ACNC: SIGNIFICANT CHANGE UP
HBV CORE IGM SER-ACNC: SIGNIFICANT CHANGE UP
HBV SURFACE AG SER-ACNC: SIGNIFICANT CHANGE UP
HCV AB S/CO SERPL IA: 0.09 S/CO — SIGNIFICANT CHANGE UP (ref 0–0.79)
HCV AB SERPL-IMP: SIGNIFICANT CHANGE UP

## 2025-02-27 ENCOUNTER — APPOINTMENT (OUTPATIENT)
Dept: GASTROENTEROLOGY | Facility: CLINIC | Age: 40
End: 2025-02-27
Payer: COMMERCIAL

## 2025-02-27 VITALS
SYSTOLIC BLOOD PRESSURE: 124 MMHG | DIASTOLIC BLOOD PRESSURE: 80 MMHG | WEIGHT: 189 LBS | HEIGHT: 63 IN | BODY MASS INDEX: 33.49 KG/M2

## 2025-02-27 DIAGNOSIS — Z09 ENCOUNTER FOR FOLLOW-UP EXAMINATION AFTER COMPLETED TREATMENT FOR CONDITIONS OTHER THAN MALIGNANT NEOPLASM: ICD-10-CM

## 2025-02-27 DIAGNOSIS — A04.8 OTHER SPECIFIED BACTERIAL INTESTINAL INFECTIONS: ICD-10-CM

## 2025-02-27 DIAGNOSIS — K59.00 CONSTIPATION, UNSPECIFIED: ICD-10-CM

## 2025-02-27 DIAGNOSIS — R10.9 UNSPECIFIED ABDOMINAL PAIN: ICD-10-CM

## 2025-02-27 PROCEDURE — 99203 OFFICE O/P NEW LOW 30 MIN: CPT

## 2025-03-04 PROBLEM — K59.00 CONSTIPATION: Status: ACTIVE | Noted: 2025-03-04

## 2025-03-04 PROBLEM — A04.8 HELICOBACTER PYLORI INFECTION: Status: ACTIVE | Noted: 2025-03-04

## 2025-03-04 LAB — H PYLORI AG STL QL: POSITIVE

## 2025-03-04 RX ORDER — BISMUTH SUBSALICYLATE 262 MG/1
262 TABLET, CHEWABLE ORAL 4 TIMES DAILY
Qty: 112 | Refills: 0 | Status: ACTIVE | COMMUNITY
Start: 2025-03-04 | End: 1900-01-01

## 2025-03-04 RX ORDER — TETRACYCLINE HYDROCHLORIDE 500 MG/1
500 CAPSULE ORAL EVERY 6 HOURS
Qty: 56 | Refills: 0 | Status: ACTIVE | COMMUNITY
Start: 2025-03-04 | End: 1900-01-01

## 2025-03-04 RX ORDER — PANTOPRAZOLE 40 MG/1
40 TABLET, DELAYED RELEASE ORAL
Qty: 28 | Refills: 0 | Status: ACTIVE | COMMUNITY
Start: 2025-03-04 | End: 1900-01-01

## 2025-03-04 RX ORDER — METRONIDAZOLE 500 MG/1
500 TABLET ORAL EVERY 6 HOURS
Qty: 56 | Refills: 0 | Status: ACTIVE | COMMUNITY
Start: 2025-03-04 | End: 1900-01-01

## 2025-04-30 ENCOUNTER — APPOINTMENT (OUTPATIENT)
Dept: GASTROENTEROLOGY | Facility: CLINIC | Age: 40
End: 2025-04-30

## 2025-05-14 ENCOUNTER — EMERGENCY (EMERGENCY)
Facility: HOSPITAL | Age: 40
LOS: 0 days | Discharge: HOME | End: 2025-05-14
Attending: EMERGENCY MEDICINE
Payer: SELF-PAY

## 2025-05-14 VITALS — HEIGHT: 63 IN | WEIGHT: 179.9 LBS

## 2025-05-14 VITALS
SYSTOLIC BLOOD PRESSURE: 129 MMHG | DIASTOLIC BLOOD PRESSURE: 80 MMHG | HEART RATE: 59 BPM | RESPIRATION RATE: 18 BRPM | OXYGEN SATURATION: 100 %

## 2025-05-14 DIAGNOSIS — R51.9 HEADACHE, UNSPECIFIED: ICD-10-CM

## 2025-05-14 DIAGNOSIS — Z86.69 PERSONAL HISTORY OF OTHER DISEASES OF THE NERVOUS SYSTEM AND SENSE ORGANS: ICD-10-CM

## 2025-05-14 DIAGNOSIS — Z98.891 HISTORY OF UTERINE SCAR FROM PREVIOUS SURGERY: Chronic | ICD-10-CM

## 2025-05-14 DIAGNOSIS — H92.01 OTALGIA, RIGHT EAR: ICD-10-CM

## 2025-05-14 DIAGNOSIS — H57.11 OCULAR PAIN, RIGHT EYE: ICD-10-CM

## 2025-05-14 DIAGNOSIS — H93.11 TINNITUS, RIGHT EAR: ICD-10-CM

## 2025-05-14 LAB
ANION GAP SERPL CALC-SCNC: 7 MMOL/L — SIGNIFICANT CHANGE UP (ref 5–17)
BASOPHILS # BLD AUTO: 0.09 K/UL — SIGNIFICANT CHANGE UP (ref 0–0.2)
BASOPHILS NFR BLD AUTO: 0.8 % — SIGNIFICANT CHANGE UP (ref 0–2)
BUN SERPL-MCNC: 12 MG/DL — SIGNIFICANT CHANGE UP (ref 7–23)
CALCIUM SERPL-MCNC: 9.3 MG/DL — SIGNIFICANT CHANGE UP (ref 8.5–10.1)
CHLORIDE SERPL-SCNC: 106 MMOL/L — SIGNIFICANT CHANGE UP (ref 96–108)
CO2 SERPL-SCNC: 25 MMOL/L — SIGNIFICANT CHANGE UP (ref 22–31)
CREAT SERPL-MCNC: 0.52 MG/DL — SIGNIFICANT CHANGE UP (ref 0.5–1.3)
CRP SERPL-MCNC: 9.1 MG/L — HIGH (ref 0–5)
EGFR: 120 ML/MIN/1.73M2 — SIGNIFICANT CHANGE UP
EGFR: 120 ML/MIN/1.73M2 — SIGNIFICANT CHANGE UP
EOSINOPHIL # BLD AUTO: 0.29 K/UL — SIGNIFICANT CHANGE UP (ref 0–0.5)
EOSINOPHIL NFR BLD AUTO: 2.4 % — SIGNIFICANT CHANGE UP (ref 0–6)
ERYTHROCYTE [SEDIMENTATION RATE] IN BLOOD: 15 MM/HR — SIGNIFICANT CHANGE UP (ref 0–20)
GLUCOSE SERPL-MCNC: 94 MG/DL — SIGNIFICANT CHANGE UP (ref 70–99)
HCT VFR BLD CALC: 36.4 % — SIGNIFICANT CHANGE UP (ref 34.5–45)
HGB BLD-MCNC: 11.7 G/DL — SIGNIFICANT CHANGE UP (ref 11.5–15.5)
IMM GRANULOCYTES # BLD AUTO: 0.13 K/UL — HIGH (ref 0–0.07)
IMM GRANULOCYTES NFR BLD AUTO: 1.1 % — HIGH (ref 0–0.9)
LYMPHOCYTES # BLD AUTO: 3.26 K/UL — SIGNIFICANT CHANGE UP (ref 1–3.3)
LYMPHOCYTES NFR BLD AUTO: 27.2 % — SIGNIFICANT CHANGE UP (ref 13–44)
MCHC RBC-ENTMCNC: 27.2 PG — SIGNIFICANT CHANGE UP (ref 27–34)
MCHC RBC-ENTMCNC: 32.1 G/DL — SIGNIFICANT CHANGE UP (ref 32–36)
MCV RBC AUTO: 84.7 FL — SIGNIFICANT CHANGE UP (ref 80–100)
MONOCYTES # BLD AUTO: 0.64 K/UL — SIGNIFICANT CHANGE UP (ref 0–0.9)
MONOCYTES NFR BLD AUTO: 5.3 % — SIGNIFICANT CHANGE UP (ref 2–14)
NEUTROPHILS # BLD AUTO: 7.57 K/UL — HIGH (ref 1.8–7.4)
NEUTROPHILS NFR BLD AUTO: 63.2 % — SIGNIFICANT CHANGE UP (ref 43–77)
NRBC # BLD AUTO: 0 K/UL — SIGNIFICANT CHANGE UP (ref 0–0)
NRBC # FLD: 0 K/UL — SIGNIFICANT CHANGE UP (ref 0–0)
NRBC BLD AUTO-RTO: 0 /100 WBCS — SIGNIFICANT CHANGE UP (ref 0–0)
PLATELET # BLD AUTO: 371 K/UL — SIGNIFICANT CHANGE UP (ref 150–400)
PMV BLD: 10.1 FL — SIGNIFICANT CHANGE UP (ref 7–13)
POCT URINE PREGNANCY TEST: NEGATIVE — SIGNIFICANT CHANGE UP
POTASSIUM SERPL-MCNC: 3.6 MMOL/L — SIGNIFICANT CHANGE UP (ref 3.5–5.3)
POTASSIUM SERPL-SCNC: 3.6 MMOL/L — SIGNIFICANT CHANGE UP (ref 3.5–5.3)
RBC # BLD: 4.3 M/UL — SIGNIFICANT CHANGE UP (ref 3.8–5.2)
RBC # FLD: 13.4 % — SIGNIFICANT CHANGE UP (ref 10.3–14.5)
SODIUM SERPL-SCNC: 138 MMOL/L — SIGNIFICANT CHANGE UP (ref 135–145)
WBC # BLD: 11.98 K/UL — HIGH (ref 3.8–10.5)
WBC # FLD AUTO: 11.98 K/UL — HIGH (ref 3.8–10.5)

## 2025-05-14 PROCEDURE — 96375 TX/PRO/DX INJ NEW DRUG ADDON: CPT

## 2025-05-14 PROCEDURE — 85025 COMPLETE CBC W/AUTO DIFF WBC: CPT

## 2025-05-14 PROCEDURE — 96374 THER/PROPH/DIAG INJ IV PUSH: CPT

## 2025-05-14 PROCEDURE — 85652 RBC SED RATE AUTOMATED: CPT

## 2025-05-14 PROCEDURE — 36415 COLL VENOUS BLD VENIPUNCTURE: CPT

## 2025-05-14 PROCEDURE — 99284 EMERGENCY DEPT VISIT MOD MDM: CPT

## 2025-05-14 PROCEDURE — 81025 URINE PREGNANCY TEST: CPT

## 2025-05-14 PROCEDURE — 86140 C-REACTIVE PROTEIN: CPT

## 2025-05-14 PROCEDURE — 80048 BASIC METABOLIC PNL TOTAL CA: CPT

## 2025-05-14 PROCEDURE — 99284 EMERGENCY DEPT VISIT MOD MDM: CPT | Mod: 25

## 2025-05-14 RX ORDER — DIPHENHYDRAMINE HCL 12.5MG/5ML
25 ELIXIR ORAL ONCE
Refills: 0 | Status: COMPLETED | OUTPATIENT
Start: 2025-05-14 | End: 2025-05-14

## 2025-05-14 RX ORDER — METOCLOPRAMIDE HCL 10 MG
10 TABLET ORAL ONCE
Refills: 0 | Status: COMPLETED | OUTPATIENT
Start: 2025-05-14 | End: 2025-05-14

## 2025-05-14 RX ORDER — KETOROLAC TROMETHAMINE 30 MG/ML
15 INJECTION, SOLUTION INTRAMUSCULAR; INTRAVENOUS ONCE
Refills: 0 | Status: DISCONTINUED | OUTPATIENT
Start: 2025-05-14 | End: 2025-05-14

## 2025-05-14 RX ADMIN — KETOROLAC TROMETHAMINE 15 MILLIGRAM(S): 30 INJECTION, SOLUTION INTRAMUSCULAR; INTRAVENOUS at 18:12

## 2025-05-14 RX ADMIN — Medication 10 MILLIGRAM(S): at 17:35

## 2025-05-14 RX ADMIN — Medication 25 MILLIGRAM(S): at 17:34

## 2025-05-14 RX ADMIN — Medication 2000 MILLILITER(S): at 17:34

## 2025-05-14 NOTE — ED ADULT NURSE NOTE - NSFALLRISKINTERV_ED_ALL_ED

## 2025-05-14 NOTE — ED ADULT NURSE NOTE - NEURO MENTATION
normal Bactrim Pregnancy And Lactation Text: This medication is Pregnancy Category D and is known to cause fetal risk.  It is also excreted in breast milk.

## 2025-05-14 NOTE — ED STATDOCS - PATIENT PORTAL LINK FT
You can access the FollowMyHealth Patient Portal offered by [facility] by registering at the following website: [email or web address]. By joining [employer]’s FollowMyHealth portal, you will also be able to view your health information using other applications (apps) compatible with our system.

## 2025-05-14 NOTE — ED STATDOCS - OBJECTIVE STATEMENT
41 y/o F with PMHx of migraines presents to the ED c/o R sided HA, R eye pain, and R ear pain x3 days. Reports the pain has been worsening since Monday. States the pain started as a throbbing pain has gradually turned into a stabbing pain. States the eye itself hurts, reports a burning pain. Reports this pain is different than previous HAs due to the pain in her ear. Endorses tinnitus, throbbing ear pain, pain with opening and closing her mouth, and intermittent dizziness. Denies vision changes, light sensitivity, tooth pain, nausea, vomiting. Pt took Advil this morning for the pain. NKDA. Pt speaks Portuguese, daughter at bedside translating.

## 2025-05-14 NOTE — ED ADULT NURSE NOTE - NSSEPSISSUSPECTED_ED_A_ED
[FreeTextEntry1] : FRACISCO SOTELO is a 53 year old female presents for evaluation.   > Venous insufficiency, CEAP C4a - s/p left GSV RFA and distal GSV Varithena (9/2024) - doing well post procedure - reviewed right leg venous duplex w. patient. negative for dvt/svt/insufficiency. symptoms remain controlled w/ conservative management.  - For symptom management, recommend use of compression stockings (20-30 mmhg, prescription given), leg elevation, and ambulation. - f/u as needed. 
No

## 2025-05-14 NOTE — ED ADULT TRIAGE NOTE - CHIEF COMPLAINT QUOTE
patient c/o right sided head pain.  "throbbing pain" on right side of head with right ear and eye pain.  reports started monday, worsening today.

## 2025-05-14 NOTE — ED STATDOCS - ATTENDING APP SHARED VISIT CONTRIBUTION OF CARE
I, Jonathan Robb, performed the initial face to face bedside interview with this patient regarding history of present illness, review of symptoms and relevant past medical, social and family history.  I completed an independent physical examination.  I was the initial provider who evaluated this patient. I have signed out the follow up of any pending tests (i.e. labs, radiological studies) to the ACP.  I have communicated the patient’s plan of care and disposition with the ACP.  The history, relevant review of systems, past medical and surgical history, medical decision making, and physical examination was documented by the scribe in my presence and I attest to the accuracy of the documentation.

## 2025-05-14 NOTE — ED STATDOCS - PHYSICAL EXAMINATION
CONSTITUTIONAL: Well appearing, awake, alert, oriented to person, place, time/situation and in no apparent distress.  · ENMT: Airway patent, Nasal mucosa clear. Mouth with normal mucosa. Throat has no vesicles, no oropharyngeal exudates and uvula is midline. TMJ  · EYES: Clear bilaterally, pupils equal, round and reactive to light. b/l eye pressures 20-21  · CARDIAC: Normal rate, regular rhythm.  Heart sounds S1, S2.  No murmurs, rubs or gallops.  · RESPIRATORY: Breath sounds clear and equal bilaterally.  · GASTROINTESTINAL: Abdomen soft, non-tender, no guarding.  · MUSCULOSKELETAL: Spine appears normal, range of motion is not limited. R parietal and temporal scalp tenderness  · NEUROLOGICAL: Alert and oriented, no focal deficits, no motor or sensory deficits.  · SKIN: Skin normal color for race, warm, dry and intact. No evidence of rash

## 2025-05-14 NOTE — ED ADULT NURSE NOTE - OBJECTIVE STATEMENT
Headache unrelieved by Motrin and Tylenol.  Patient has a hx of Migraine headaches approximately 1 a month and has not seen a Neurologist.  Patient seen in ED a few days ago and started on an antibiotic for a "stomach" infection.

## 2025-05-14 NOTE — ED ADULT NURSE NOTE - HOW OFTEN DO YOU HAVE A DRINK CONTAINING ALCOHOL?
Monthly or less Solaraze Counseling:  I discussed with the patient the risks of Solaraze including but not limited to erythema, scaling, itching, weeping, crusting, and pain.

## 2025-05-14 NOTE — ED STATDOCS - PROGRESS NOTE DETAILS
ALEXEY Haque: 41 y/o F with no reported pmhx who presents to the ED c/o right sided HA and neck pain since Monday night. Pt states that pain progressively worsened and today came to the ED for evaluation of the pain. Endorses associated right ear pain with some tinnitus and right eye pain. pt has a hx of headaches and states it feels similar except for the right ear pain. Denies f/c, neck stiffness, dysphagia, drainage from the ear, decreased or muffled hearing, pain with movement of the eye, eye redness or drainage.   PE; right TM with no bugling or erythema, no mastoid tenderness, no auricular tenderness to palpation. No tenderness to palpation over temporal region on right. no focal neurological deficits. IOP in right eye 22, left eye 20. PERRLA. EOMI.  Plan: labs, check ESR and CRP given location of pain over temporal region, pain control with fluids, reglan and toradol. Differentials include tension HA from grinding of teeth, temporal arteritis, migraine Labs unremarkable. ESR 15, CRP 9.1. CBC and CMP within normal limits.   Pt reassessed - pt with improvement in sx at this time. VSS.   Plan to d/c with referral for neurology given as pt has hx of frequent headaches. Stable for d/c at this time.

## 2025-05-14 NOTE — ED STATDOCS - NSFOLLOWUPINSTRUCTIONS_ED_ALL_ED_FT
For pain:   Take Tylenol 650mg (Two regular strength 325 mg pills) every 4-6 hours or two extra strength 500mg tablets every 8 hours as needed for pain or fevers. Do not exceed 1000mg at one time or 4000mg in a 24 hour period.     Take Motrin (also sold as Advil or Ibuprofen) 400-600 mg (two or three 200 mg over the counter pills) every 8 hours as needed for moderate pain or fevers-- take with food; do not take for more than 5 consecutive days.    You can switch between the two medications every 3 hours.     Please follow-up with a neurologist for further evaluation of your headaches - bring a copy of the results from today's visit with you.     Acute Headache    WHAT YOU NEED TO KNOW:    An acute headache is pain or discomfort that starts suddenly and gets worse quickly. You may have an acute headache only when you feel stress or eat certain foods. Other acute headache pain can happen every day, and sometimes several times a day.    DISCHARGE INSTRUCTIONS:    Return to the emergency department if:    You have severe pain.    You have numbness or weakness on one side of your face or body.    You have a headache that occurs after a blow to the head, a fall, or other trauma.    You have a headache, are forgetful or confused, or have trouble speaking.    You have a headache, stiff neck, and a fever.  Contact your healthcare provider if:    You have a constant headache and are vomiting.    You have a headache each day that does not get better, even after treatment.    You have changes in your headaches, or new symptoms that occur when you have a headache.    You have questions or concerns about your condition or care.  Medicines: You may need any of the following:    Prescription pain medicine may be given. The medicine your healthcare provider recommends will depend on the kind of headaches you have. You will need to take prescription headache medicines as directed to prevent a problem called rebound headache. These headaches happen with regular use of pain relievers for headache disorders.    NSAIDs, such as ibuprofen, help decrease swelling, pain, and fever. This medicine is available with or without a doctor's order. NSAIDs can cause stomach bleeding or kidney problems in certain people. If you take blood thinner medicine, always ask your healthcare provider if NSAIDs are safe for you. Always read the medicine label and follow directions.    Acetaminophen decreases pain and fever. It is available without a doctor's order. Ask how much to take and how often to take it. Follow directions. Read the labels of all other medicines you are using to see if they also contain acetaminophen, or ask your doctor or pharmacist. Acetaminophen can cause liver damage if not taken correctly. Do not use more than 3 grams (3,000 milligrams) total of acetaminophen in one day.    Antidepressants may be given for some kinds of headaches.    Take your medicine as directed. Contact your healthcare provider if you think your medicine is not helping or if you have side effects. Tell him or her if you are allergic to any medicine. Keep a list of the medicines, vitamins, and herbs you take. Include the amounts, and when and why you take them. Bring the list or the pill bottles to follow-up visits. Carry your medicine list with you in case of an emergency.  Manage your symptoms:    Apply heat or ice on the headache area. Use a heat or ice pack. For an ice pack, you can also put crushed ice in a plastic bag. Cover the pack or bag with a towel before you apply it to your skin. Ice and heat both help decrease pain, and heat also helps decrease muscle spasms. Apply heat for 20 to 30 minutes every 2 hours. Apply ice for 15 to 20 minutes every hour. Apply heat or ice for as long and for as many days as directed. You may alternate heat and ice.    Relax your muscles. Lie down in a comfortable position and close your eyes. Relax your muscles slowly. Start at your toes and work your way up your body.    Keep a record of your headaches. Write down when your headaches start and stop. Include your symptoms and what you were doing when the headache began. Record what you ate or drank for 24 hours before the headache started. Describe the pain and where it hurts. Keep track of what you did to treat your headache and if it worked.  Prevent an acute headache:    Avoid anything that triggers an acute headache. Examples include exposure to chemicals, going to high altitude, or not getting enough sleep. Create a regular sleep routine. Go to sleep at the same time and wake up at the same time each day. Do not use electronic devices before bedtime. These may trigger a headache or prevent you from sleeping well.    Do not smoke. Nicotine and other chemicals in cigarettes and cigars can trigger an acute headache or make it worse. Ask your healthcare provider for information if you currently smoke and need help to quit. E-cigarettes or smokeless tobacco still contain nicotine. Talk to your healthcare provider before you use these products.    Limit alcohol as directed. Alcohol can trigger an acute headache or make it worse. If you have cluster headaches, do not drink alcohol during an episode. For other types of headaches, ask your healthcare provider if it is safe for you to drink alcohol. Ask how much is safe for you to drink, and how often.    Exercise as directed. Exercise can reduce tension and help with headache pain. Aim for 30 minutes of physical activity on most days of the week. Your healthcare provider can help you create an exercise plan.    Eat a variety of healthy foods. Healthy foods include fruits, vegetables, low-fat dairy products, lean meats, fish, whole grains, and cooked beans. Your healthcare provider or dietitian can help you create meals plans if you need to avoid foods that trigger headaches.  Follow up with your healthcare provider as directed: Bring your headache record with you when you see your healthcare provider. Write down your questions so you remember to ask them during your visits.

## 2025-05-14 NOTE — ED ADULT NURSE NOTE - NSICDXPASTSURGICALHX_GEN_ALL_CORE_FT
Patient presents to the clinic for non-covered routine foot care. Patient is not a high risk foot care patient. Patient understands this is not typically a covered service and patient is aware of responsibility of payment. Pedal pulses are palpable. No know risk factors requiring routine foot care. Nails are elongated and thickened on feet. Diagnosis is onychauxis. Nails were reduced and trimmed bilaterally. Patient tolerated well.     Primitivo was seen today for nail care.    Diagnoses and all orders for this visit:    Onychomycosis due to dermatophyte    Corn or callus      RTC 10 weeks proc b, sooner PRN      PAST SURGICAL HISTORY:  H/O:

## 2025-07-17 ENCOUNTER — APPOINTMENT (OUTPATIENT)
Dept: NEUROLOGY | Facility: CLINIC | Age: 40
End: 2025-07-17
Payer: SELF-PAY

## 2025-07-17 VITALS
WEIGHT: 195 LBS | BODY MASS INDEX: 34.55 KG/M2 | TEMPERATURE: 98.2 F | DIASTOLIC BLOOD PRESSURE: 79 MMHG | HEIGHT: 63 IN | HEART RATE: 79 BPM | SYSTOLIC BLOOD PRESSURE: 117 MMHG

## 2025-07-17 PROCEDURE — 99204 OFFICE O/P NEW MOD 45 MIN: CPT

## 2025-07-17 RX ORDER — TOPIRAMATE 25 MG/1
25 TABLET, FILM COATED ORAL
Qty: 72 | Refills: 1 | Status: ACTIVE | COMMUNITY
Start: 2025-07-17 | End: 1900-01-01

## 2025-07-17 RX ORDER — RIZATRIPTAN BENZOATE 10 MG/1
10 TABLET ORAL
Qty: 12 | Refills: 5 | Status: ACTIVE | COMMUNITY
Start: 2025-07-17 | End: 1900-01-01